# Patient Record
Sex: FEMALE | Race: WHITE | NOT HISPANIC OR LATINO | Employment: FULL TIME | URBAN - NONMETROPOLITAN AREA
[De-identification: names, ages, dates, MRNs, and addresses within clinical notes are randomized per-mention and may not be internally consistent; named-entity substitution may affect disease eponyms.]

---

## 2017-01-20 ENCOUNTER — APPOINTMENT (OUTPATIENT)
Dept: CT IMAGING | Facility: HOSPITAL | Age: 42
End: 2017-01-20

## 2017-01-20 ENCOUNTER — APPOINTMENT (OUTPATIENT)
Dept: GENERAL RADIOLOGY | Facility: HOSPITAL | Age: 42
End: 2017-01-20

## 2017-01-20 ENCOUNTER — HOSPITAL ENCOUNTER (EMERGENCY)
Facility: HOSPITAL | Age: 42
Discharge: HOME | End: 2017-01-21
Attending: FAMILY MEDICINE | Admitting: EMERGENCY MEDICINE

## 2017-01-20 DIAGNOSIS — R11.2 NON-INTRACTABLE VOMITING WITH NAUSEA, UNSPECIFIED VOMITING TYPE: Primary | ICD-10-CM

## 2017-01-20 LAB
ALBUMIN SERPL-MCNC: 4.2 G/DL (ref 3.5–5)
ALBUMIN/GLOB SERPL: 1.4 G/DL (ref 1.5–2.5)
ALP SERPL-CCNC: 64 U/L (ref 46–116)
ALT SERPL W P-5'-P-CCNC: 82 U/L (ref 10–36)
ANION GAP SERPL CALCULATED.3IONS-SCNC: 12.2 MMOL/L (ref 3.6–11.2)
AST SERPL-CCNC: 72 U/L (ref 10–30)
BASOPHILS # BLD AUTO: 0.02 10*3/MM3 (ref 0–0.3)
BASOPHILS NFR BLD AUTO: 0.3 % (ref 0–2)
BILIRUB SERPL-MCNC: 0.6 MG/DL (ref 0.2–1.8)
BUN BLD-MCNC: 10 MG/DL (ref 7–21)
BUN/CREAT SERPL: 13.9 (ref 7–25)
CALCIUM SPEC-SCNC: 9.3 MG/DL (ref 7.7–10)
CHLORIDE SERPL-SCNC: 103 MMOL/L (ref 99–112)
CO2 SERPL-SCNC: 25.8 MMOL/L (ref 24.3–31.9)
CREAT BLD-MCNC: 0.72 MG/DL (ref 0.43–1.29)
DEPRECATED RDW RBC AUTO: 36 FL (ref 37–54)
DEVELOPER EXPIRATION DATE: NORMAL
DEVELOPER LOT NUMBER: NORMAL
EOSINOPHIL # BLD AUTO: 0.15 10*3/MM3 (ref 0–0.7)
EOSINOPHIL NFR BLD AUTO: 2.4 % (ref 0–5)
ERYTHROCYTE [DISTWIDTH] IN BLOOD BY AUTOMATED COUNT: 12.5 % (ref 11.5–14.5)
EXPIRATION DATE: NORMAL
FECAL OCCULT BLOOD SCREEN, POC: NORMAL
GFR SERPL CREATININE-BSD FRML MDRD: 90 ML/MIN/1.73
GLOBULIN UR ELPH-MCNC: 3.1 GM/DL
GLUCOSE BLD-MCNC: 279 MG/DL (ref 70–110)
HCT VFR BLD AUTO: 44.1 % (ref 37–47)
HGB BLD-MCNC: 15.2 G/DL (ref 12–16)
IMM GRANULOCYTES # BLD: 0.04 10*3/MM3 (ref 0–0.03)
IMM GRANULOCYTES NFR BLD: 0.6 % (ref 0–0.5)
LIPASE SERPL-CCNC: 38 U/L (ref 13–60)
LYMPHOCYTES # BLD AUTO: 2.39 10*3/MM3 (ref 1–3)
LYMPHOCYTES NFR BLD AUTO: 38.2 % (ref 21–51)
Lab: NORMAL
MCH RBC QN AUTO: 27.8 PG (ref 27–33)
MCHC RBC AUTO-ENTMCNC: 34.5 G/DL (ref 33–37)
MCV RBC AUTO: 80.6 FL (ref 80–94)
MONOCYTES # BLD AUTO: 0.35 10*3/MM3 (ref 0.1–0.9)
MONOCYTES NFR BLD AUTO: 5.6 % (ref 0–10)
NEGATIVE CONTROL: NEGATIVE
NEUTROPHILS # BLD AUTO: 3.3 10*3/MM3 (ref 1.4–6.5)
NEUTROPHILS NFR BLD AUTO: 52.9 % (ref 30–70)
OSMOLALITY SERPL CALC.SUM OF ELEC: 290.3 MOSM/KG (ref 273–305)
PLATELET # BLD AUTO: 193 10*3/MM3 (ref 130–400)
PMV BLD AUTO: 11 FL (ref 6–10)
POSITIVE CONTROL: POSITIVE
POTASSIUM BLD-SCNC: 3.4 MMOL/L (ref 3.5–5.3)
PROT SERPL-MCNC: 7.3 G/DL (ref 6–8)
RBC # BLD AUTO: 5.47 10*6/MM3 (ref 4.2–5.4)
SODIUM BLD-SCNC: 141 MMOL/L (ref 135–153)
WBC NRBC COR # BLD: 6.25 10*3/MM3 (ref 4.5–12.5)

## 2017-01-20 PROCEDURE — 96375 TX/PRO/DX INJ NEW DRUG ADDON: CPT

## 2017-01-20 PROCEDURE — 25010000002 PROMETHAZINE PER 50 MG: Performed by: NURSE PRACTITIONER

## 2017-01-20 PROCEDURE — 25010000002 ONDANSETRON PER 1 MG: Performed by: NURSE PRACTITIONER

## 2017-01-20 PROCEDURE — 85025 COMPLETE CBC W/AUTO DIFF WBC: CPT | Performed by: NURSE PRACTITIONER

## 2017-01-20 PROCEDURE — 25010000002 MORPHINE (PF) 10 MG/ML SOLUTION: Performed by: NURSE PRACTITIONER

## 2017-01-20 PROCEDURE — 74022 RADEX COMPL AQT ABD SERIES: CPT | Performed by: RADIOLOGY

## 2017-01-20 PROCEDURE — 96361 HYDRATE IV INFUSION ADD-ON: CPT

## 2017-01-20 PROCEDURE — 36415 COLL VENOUS BLD VENIPUNCTURE: CPT

## 2017-01-20 PROCEDURE — 96374 THER/PROPH/DIAG INJ IV PUSH: CPT

## 2017-01-20 PROCEDURE — 80053 COMPREHEN METABOLIC PANEL: CPT | Performed by: NURSE PRACTITIONER

## 2017-01-20 PROCEDURE — 74176 CT ABD & PELVIS W/O CONTRAST: CPT

## 2017-01-20 PROCEDURE — 99284 EMERGENCY DEPT VISIT MOD MDM: CPT

## 2017-01-20 PROCEDURE — 74176 CT ABD & PELVIS W/O CONTRAST: CPT | Performed by: RADIOLOGY

## 2017-01-20 PROCEDURE — 74022 RADEX COMPL AQT ABD SERIES: CPT

## 2017-01-20 PROCEDURE — 83690 ASSAY OF LIPASE: CPT | Performed by: NURSE PRACTITIONER

## 2017-01-20 RX ORDER — PROMETHAZINE HYDROCHLORIDE 25 MG/1
25 SUPPOSITORY RECTAL EVERY 6 HOURS PRN
Qty: 10 SUPPOSITORY | Refills: 0 | Status: SHIPPED | OUTPATIENT
Start: 2017-01-20 | End: 2022-04-06

## 2017-01-20 RX ORDER — PROMETHAZINE HYDROCHLORIDE 25 MG/ML
12.5 INJECTION, SOLUTION INTRAMUSCULAR; INTRAVENOUS ONCE
Status: COMPLETED | OUTPATIENT
Start: 2017-01-20 | End: 2017-01-20

## 2017-01-20 RX ORDER — MORPHINE SULFATE 10 MG/ML
4 INJECTION INTRAMUSCULAR; INTRAVENOUS; SUBCUTANEOUS ONCE
Status: COMPLETED | OUTPATIENT
Start: 2017-01-20 | End: 2017-01-20

## 2017-01-20 RX ORDER — LISINOPRIL 20 MG/1
20 TABLET ORAL DAILY
COMMUNITY

## 2017-01-20 RX ORDER — SODIUM CHLORIDE 0.9 % (FLUSH) 0.9 %
10 SYRINGE (ML) INJECTION AS NEEDED
Status: DISCONTINUED | OUTPATIENT
Start: 2017-01-20 | End: 2017-01-21 | Stop reason: HOSPADM

## 2017-01-20 RX ORDER — ONDANSETRON 4 MG/1
4 TABLET, ORALLY DISINTEGRATING ORAL EVERY 6 HOURS PRN
Qty: 10 TABLET | Refills: 0 | Status: SHIPPED | OUTPATIENT
Start: 2017-01-20

## 2017-01-20 RX ORDER — PANTOPRAZOLE SODIUM 40 MG/10ML
80 INJECTION, POWDER, LYOPHILIZED, FOR SOLUTION INTRAVENOUS ONCE
Status: COMPLETED | OUTPATIENT
Start: 2017-01-20 | End: 2017-01-20

## 2017-01-20 RX ORDER — ESCITALOPRAM OXALATE 5 MG/1
5 TABLET ORAL DAILY
COMMUNITY
End: 2022-04-06

## 2017-01-20 RX ORDER — ONDANSETRON 2 MG/ML
4 INJECTION INTRAMUSCULAR; INTRAVENOUS ONCE
Status: COMPLETED | OUTPATIENT
Start: 2017-01-20 | End: 2017-01-20

## 2017-01-20 RX ORDER — GLIPIZIDE 5 MG/1
5 TABLET ORAL
COMMUNITY
End: 2022-04-06

## 2017-01-20 RX ORDER — PROMETHAZINE HYDROCHLORIDE 25 MG/1
25 SUPPOSITORY RECTAL ONCE
Status: COMPLETED | OUTPATIENT
Start: 2017-01-20 | End: 2017-01-21

## 2017-01-20 RX ADMIN — MORPHINE SULFATE 4 MG: 10 INJECTION, SOLUTION INTRAMUSCULAR; INTRAVENOUS at 21:27

## 2017-01-20 RX ADMIN — SODIUM CHLORIDE 1000 ML: 9 INJECTION, SOLUTION INTRAVENOUS at 21:20

## 2017-01-20 RX ADMIN — PANTOPRAZOLE SODIUM 80 MG: 40 INJECTION, POWDER, FOR SOLUTION INTRAVENOUS at 21:23

## 2017-01-20 RX ADMIN — PROMETHAZINE HYDROCHLORIDE 12.5 MG: 25 INJECTION INTRAMUSCULAR; INTRAVENOUS at 23:07

## 2017-01-20 RX ADMIN — ONDANSETRON 4 MG: 2 INJECTION, SOLUTION INTRAMUSCULAR; INTRAVENOUS at 21:21

## 2017-01-21 VITALS
SYSTOLIC BLOOD PRESSURE: 127 MMHG | TEMPERATURE: 98.2 F | HEIGHT: 65 IN | OXYGEN SATURATION: 98 % | BODY MASS INDEX: 41.65 KG/M2 | HEART RATE: 82 BPM | DIASTOLIC BLOOD PRESSURE: 84 MMHG | WEIGHT: 250 LBS | RESPIRATION RATE: 20 BRPM

## 2017-01-21 RX ADMIN — PROMETHAZINE HYDROCHLORIDE 25 MG: 12.5 SUPPOSITORY RECTAL at 00:22

## 2017-01-21 NOTE — ED PROVIDER NOTES
Subjective   Patient is a 41 y.o. female presenting with vomiting.   History provided by:  Patient   used: No    Vomiting   The primary symptoms include abdominal pain, nausea, vomiting and hematemesis. The illness began today (30 min PTA). The onset was sudden. The problem has not changed since onset.  The illness is also significant for dysphagia. Significant associated medical issues include gastric bypass. Associated medical issues comments: lap band.       Review of Systems   Constitutional: Negative.    HENT: Negative.    Cardiovascular: Negative.    Gastrointestinal: Positive for abdominal pain, dysphagia, hematemesis, nausea and vomiting. Negative for blood in stool.   Endocrine: Negative.    Genitourinary: Negative.    Musculoskeletal: Negative.    Skin: Negative.    Allergic/Immunologic: Negative.    Neurological: Negative.    Hematological: Negative.    Psychiatric/Behavioral: Negative.        Past Medical History   Diagnosis Date   • Depression    • Hypertension        No Known Allergies    Past Surgical History   Procedure Laterality Date   • Abdominal surgery     • Cholecystectomy     •  section     • Hysterectomy         Family History   Problem Relation Age of Onset   • No Known Problems Mother    • No Known Problems Father    • No Known Problems Sister    • No Known Problems Brother    • No Known Problems Son    • No Known Problems Daughter    • No Known Problems Maternal Grandmother    • No Known Problems Maternal Grandfather    • No Known Problems Paternal Grandmother    • No Known Problems Paternal Grandfather    • No Known Problems Cousin    • Rheum arthritis Neg Hx    • Osteoarthritis Neg Hx    • Asthma Neg Hx    • Diabetes Neg Hx    • Heart failure Neg Hx    • Hyperlipidemia Neg Hx    • Hypertension Neg Hx    • Migraines Neg Hx    • Rashes / Skin problems Neg Hx    • Seizures Neg Hx    • Stroke Neg Hx    • Thyroid disease Neg Hx        Social History     Social  History   • Marital status: Unknown     Spouse name: N/A   • Number of children: N/A   • Years of education: N/A     Social History Main Topics   • Smoking status: Never Smoker   • Smokeless tobacco: Never Used   • Alcohol use No   • Drug use: No   • Sexual activity: Defer     Other Topics Concern   • None     Social History Narrative   • None           Objective   Physical Exam   Constitutional: She is oriented to person, place, and time. She appears well-developed and well-nourished.   HENT:   Head: Normocephalic and atraumatic.   Nose: Nose normal.   Eyes: EOM are normal. Pupils are equal, round, and reactive to light.   Neck: Normal range of motion.   Cardiovascular: Regular rhythm and normal heart sounds.    tachycardia   Pulmonary/Chest: Effort normal and breath sounds normal.   Abdominal: Soft. There is tenderness.   Hyperactive bowel sounds, mild tenderness epigastric area. Patient vomiting, tiny amount of pink tinged sputum in basin.    Musculoskeletal: Normal range of motion.   Neurological: She is alert and oriented to person, place, and time.   Skin: Skin is warm and dry.   Psychiatric: She has a normal mood and affect. Her behavior is normal. Judgment and thought content normal.   Nursing note and vitals reviewed.      Procedures         ED Course  ED Course   Value Comment By Time    Patient says she feels much better Selam Chau, APRN 01/20 2158   XR Abdomen 2 View With Chest 1 View Impression: mild constipation. No free air, no gastric dilation.   [facility] report Selam Chau, APRN 01/20 2237    Patient reports dry heaving at this time, new orders placed Selam Chau, APRN 01/20 2254   CT Abdomen Pelvis Without Contrast Mild constipation per [facility] Selam Chau, APRN 01/20 2346                  MDM  Number of Diagnoses or Management Options  Non-intractable vomiting with nausea, unspecified vomiting type: new and requires workup     Amount and/or Complexity of  Data Reviewed  Clinical lab tests: ordered and reviewed  Tests in the radiology section of CPT®: reviewed and ordered  Tests in the medicine section of CPT®: reviewed and ordered    Risk of Complications, Morbidity, and/or Mortality  Presenting problems: moderate  Diagnostic procedures: moderate  Management options: moderate    Patient Progress  Patient progress: improved      Final diagnoses:   Non-intractable vomiting with nausea, unspecified vomiting type            Selam Chau, APRN  01/20/17 4897

## 2017-09-28 ENCOUNTER — HOSPITAL ENCOUNTER (EMERGENCY)
Facility: HOSPITAL | Age: 42
Discharge: LEFT BEFORE DISCHARGE | End: 2017-09-28
Attending: FAMILY MEDICINE | Admitting: FAMILY MEDICINE

## 2017-09-28 VITALS
WEIGHT: 250 LBS | OXYGEN SATURATION: 95 % | RESPIRATION RATE: 20 BRPM | TEMPERATURE: 98.1 F | SYSTOLIC BLOOD PRESSURE: 137 MMHG | DIASTOLIC BLOOD PRESSURE: 76 MMHG | BODY MASS INDEX: 41.65 KG/M2 | HEIGHT: 65 IN | HEART RATE: 70 BPM

## 2017-09-28 DIAGNOSIS — E11.9 TYPE 2 DIABETES MELLITUS TREATED WITH INSULIN (HCC): ICD-10-CM

## 2017-09-28 DIAGNOSIS — I10 ESSENTIAL HYPERTENSION: Primary | ICD-10-CM

## 2017-09-28 DIAGNOSIS — Z79.4 TYPE 2 DIABETES MELLITUS TREATED WITH INSULIN (HCC): ICD-10-CM

## 2017-09-28 PROCEDURE — 99282 EMERGENCY DEPT VISIT SF MDM: CPT

## 2017-09-28 PROCEDURE — 93005 ELECTROCARDIOGRAM TRACING: CPT | Performed by: EMERGENCY MEDICINE

## 2017-09-28 PROCEDURE — 93010 ELECTROCARDIOGRAM REPORT: CPT | Performed by: INTERNAL MEDICINE

## 2017-09-28 RX ORDER — SODIUM CHLORIDE 0.9 % (FLUSH) 0.9 %
10 SYRINGE (ML) INJECTION AS NEEDED
Status: DISCONTINUED | OUTPATIENT
Start: 2017-09-28 | End: 2017-09-28 | Stop reason: HOSPADM

## 2018-07-30 ENCOUNTER — HOSPITAL ENCOUNTER (OUTPATIENT)
Dept: MAMMOGRAPHY | Facility: HOSPITAL | Age: 43
Discharge: HOME | End: 2018-07-30
Admitting: OBSTETRICS & GYNECOLOGY

## 2018-07-30 DIAGNOSIS — Z12.39 SCREENING BREAST EXAMINATION: ICD-10-CM

## 2018-07-30 PROCEDURE — 77067 SCR MAMMO BI INCL CAD: CPT | Performed by: RADIOLOGY

## 2018-07-30 PROCEDURE — 77063 BREAST TOMOSYNTHESIS BI: CPT | Performed by: RADIOLOGY

## 2018-07-30 PROCEDURE — 77063 BREAST TOMOSYNTHESIS BI: CPT

## 2018-07-30 PROCEDURE — 77067 SCR MAMMO BI INCL CAD: CPT

## 2018-09-24 ENCOUNTER — HOSPITAL ENCOUNTER (EMERGENCY)
Facility: HOSPITAL | Age: 43
Discharge: HOME | End: 2018-09-24
Attending: EMERGENCY MEDICINE | Admitting: EMERGENCY MEDICINE

## 2018-09-24 DIAGNOSIS — Z79.4 TYPE 2 DIABETES MELLITUS WITH HYPERGLYCEMIA, WITH LONG-TERM CURRENT USE OF INSULIN (HCC): Primary | ICD-10-CM

## 2018-09-24 DIAGNOSIS — E11.65 TYPE 2 DIABETES MELLITUS WITH HYPERGLYCEMIA, WITH LONG-TERM CURRENT USE OF INSULIN (HCC): Primary | ICD-10-CM

## 2018-09-24 LAB
ALBUMIN SERPL-MCNC: 3.9 G/DL (ref 3.5–5)
ALBUMIN/GLOB SERPL: 1.3 G/DL (ref 1.5–2.5)
ALP SERPL-CCNC: 65 U/L (ref 35–104)
ALT SERPL W P-5'-P-CCNC: 83 U/L (ref 10–36)
ANION GAP SERPL CALCULATED.3IONS-SCNC: 9.9 MMOL/L (ref 3.6–11.2)
AST SERPL-CCNC: 64 U/L (ref 10–30)
B-HCG UR QL: NEGATIVE
BASOPHILS # BLD AUTO: 0.04 10*3/MM3 (ref 0–0.3)
BASOPHILS NFR BLD AUTO: 0.6 % (ref 0–2)
BILIRUB SERPL-MCNC: 0.4 MG/DL (ref 0.2–1.8)
BILIRUB UR QL STRIP: NEGATIVE
BUN BLD-MCNC: 10 MG/DL (ref 7–21)
BUN/CREAT SERPL: 12.7 (ref 7–25)
CALCIUM SPEC-SCNC: 8.8 MG/DL (ref 7.7–10)
CHLORIDE SERPL-SCNC: 101 MMOL/L (ref 99–112)
CLARITY UR: CLEAR
CO2 SERPL-SCNC: 24.1 MMOL/L (ref 24.3–31.9)
COLOR UR: YELLOW
CREAT BLD-MCNC: 0.79 MG/DL (ref 0.43–1.29)
DEPRECATED RDW RBC AUTO: 37 FL (ref 37–54)
EOSINOPHIL # BLD AUTO: 0.23 10*3/MM3 (ref 0–0.7)
EOSINOPHIL NFR BLD AUTO: 3.4 % (ref 0–5)
ERYTHROCYTE [DISTWIDTH] IN BLOOD BY AUTOMATED COUNT: 12.8 % (ref 11.5–14.5)
GFR SERPL CREATININE-BSD FRML MDRD: 80 ML/MIN/1.73
GLOBULIN UR ELPH-MCNC: 3.1 GM/DL
GLUCOSE BLD-MCNC: 386 MG/DL (ref 70–110)
GLUCOSE BLDC GLUCOMTR-MCNC: 355 MG/DL (ref 70–130)
GLUCOSE BLDC GLUCOMTR-MCNC: 379 MG/DL (ref 70–130)
GLUCOSE UR STRIP-MCNC: ABNORMAL MG/DL
HBA1C MFR BLD: 10.2 % (ref 4.5–5.7)
HCT VFR BLD AUTO: 41.4 % (ref 37–47)
HGB BLD-MCNC: 14.4 G/DL (ref 12–16)
HGB UR QL STRIP.AUTO: NEGATIVE
HOLD SPECIMEN: NORMAL
HOLD SPECIMEN: NORMAL
IMM GRANULOCYTES # BLD: 0.02 10*3/MM3 (ref 0–0.03)
IMM GRANULOCYTES NFR BLD: 0.3 % (ref 0–0.5)
KETONES UR QL STRIP: NEGATIVE
LEUKOCYTE ESTERASE UR QL STRIP.AUTO: NEGATIVE
LYMPHOCYTES # BLD AUTO: 2.76 10*3/MM3 (ref 1–3)
LYMPHOCYTES NFR BLD AUTO: 40.2 % (ref 21–51)
MCH RBC QN AUTO: 28.5 PG (ref 27–33)
MCHC RBC AUTO-ENTMCNC: 34.8 G/DL (ref 33–37)
MCV RBC AUTO: 81.8 FL (ref 80–94)
MONOCYTES # BLD AUTO: 0.38 10*3/MM3 (ref 0.1–0.9)
MONOCYTES NFR BLD AUTO: 5.5 % (ref 0–10)
NEUTROPHILS # BLD AUTO: 3.43 10*3/MM3 (ref 1.4–6.5)
NEUTROPHILS NFR BLD AUTO: 50 % (ref 30–70)
NITRITE UR QL STRIP: NEGATIVE
OSMOLALITY SERPL CALC.SUM OF ELEC: 285.1 MOSM/KG (ref 273–305)
PH UR STRIP.AUTO: 5.5 [PH] (ref 5–8)
PLATELET # BLD AUTO: 162 10*3/MM3 (ref 130–400)
PMV BLD AUTO: 11.3 FL (ref 6–10)
POTASSIUM BLD-SCNC: 3.7 MMOL/L (ref 3.5–5.3)
PROT SERPL-MCNC: 7 G/DL (ref 6–8)
PROT UR QL STRIP: NEGATIVE
RBC # BLD AUTO: 5.06 10*6/MM3 (ref 4.2–5.4)
SODIUM BLD-SCNC: 135 MMOL/L (ref 135–153)
SP GR UR STRIP: >1.03 (ref 1–1.03)
UROBILINOGEN UR QL STRIP: ABNORMAL
WBC NRBC COR # BLD: 6.86 10*3/MM3 (ref 4.5–12.5)
WHOLE BLOOD HOLD SPECIMEN: NORMAL
WHOLE BLOOD HOLD SPECIMEN: NORMAL

## 2018-09-24 PROCEDURE — 85025 COMPLETE CBC W/AUTO DIFF WBC: CPT | Performed by: PHYSICIAN ASSISTANT

## 2018-09-24 PROCEDURE — 96360 HYDRATION IV INFUSION INIT: CPT

## 2018-09-24 PROCEDURE — 83036 HEMOGLOBIN GLYCOSYLATED A1C: CPT | Performed by: PHYSICIAN ASSISTANT

## 2018-09-24 PROCEDURE — 63710000001 INSULIN REGULAR HUMAN PER 5 UNITS: Performed by: PHYSICIAN ASSISTANT

## 2018-09-24 PROCEDURE — 99284 EMERGENCY DEPT VISIT MOD MDM: CPT

## 2018-09-24 PROCEDURE — 81025 URINE PREGNANCY TEST: CPT | Performed by: PHYSICIAN ASSISTANT

## 2018-09-24 PROCEDURE — 81003 URINALYSIS AUTO W/O SCOPE: CPT | Performed by: PHYSICIAN ASSISTANT

## 2018-09-24 PROCEDURE — 82962 GLUCOSE BLOOD TEST: CPT

## 2018-09-24 PROCEDURE — 80053 COMPREHEN METABOLIC PANEL: CPT | Performed by: PHYSICIAN ASSISTANT

## 2018-09-24 RX ORDER — INSULIN GLARGINE 100 [IU]/ML
80 INJECTION, SOLUTION SUBCUTANEOUS DAILY
COMMUNITY
End: 2022-04-06

## 2018-09-24 RX ORDER — SODIUM CHLORIDE 0.9 % (FLUSH) 0.9 %
10 SYRINGE (ML) INJECTION AS NEEDED
Status: DISCONTINUED | OUTPATIENT
Start: 2018-09-24 | End: 2018-09-25 | Stop reason: HOSPADM

## 2018-09-24 RX ADMIN — HUMAN INSULIN 5 UNITS: 100 INJECTION, SOLUTION SUBCUTANEOUS at 18:22

## 2018-09-24 RX ADMIN — SODIUM CHLORIDE 2000 ML: 9 INJECTION, SOLUTION INTRAVENOUS at 18:22

## 2018-09-25 VITALS
HEART RATE: 70 BPM | TEMPERATURE: 98 F | WEIGHT: 250 LBS | RESPIRATION RATE: 18 BRPM | HEIGHT: 65 IN | BODY MASS INDEX: 41.65 KG/M2 | SYSTOLIC BLOOD PRESSURE: 120 MMHG | DIASTOLIC BLOOD PRESSURE: 70 MMHG | OXYGEN SATURATION: 98 %

## 2018-09-25 NOTE — ED NOTES
Pt resting on stretcher at this time, denies any needs or pain. Respirations are even and unlabored, NADN, skin PWD, bed in low position, one rail up, call light is within reach. Will continue to monitor pt status and follow the plan of care.     Giuliano Bauman RN  09/25/18 0003

## 2018-09-25 NOTE — ED NOTES
Pt resting on stretcher at this time, denies any needs or pain. Respirations are even and unlabored, NADN, skin PWD, bed in low position, one rail up, call light is within reach. Will continue to monitor pt status and follow the plan of care.     Giuliano Bauman RN  09/24/18 4603

## 2018-09-25 NOTE — ED NOTES
Pt resting on stretcher at this time, denies any needs or pain. Respirations are even and unlabored, NADN, skin PWD, bed in low position, one rail up, call light is within reach. Will continue to monitor pt status and follow the plan of care.     Giuliano Bauman RN  09/24/18 0989

## 2018-09-25 NOTE — ED NOTES
Pt resting on stretcher at this time, denies any needs or pain. Respirations are even and unlabored, NADN, skin PWD, bed in low position, one rail up, call light is within reach. Will continue to monitor pt status and follow the plan of care.     Giuliano Bauman RN  09/24/18 4151

## 2018-09-25 NOTE — ED NOTES
Pt resting on stretcher at this time, denies any needs or pain. Respirations are even and unlabored, NADN, skin PWD, bed in low position, one rail up, call light is within reach. Will continue to monitor pt status and follow the plan of care.     Giuliano Bauman RN  09/24/18 7272

## 2018-09-27 NOTE — ED PROVIDER NOTES
Subjective   43 year old female presents to the ED today for elevated blood sugar.  She states she days ago her blood sugar was running over 800.  She states today it was 400.  She states normally she tries to keep her under 200.  She states she feels very fatigued and has had decreased energy.  She has had increased thirst and increased urination.  She denies any known fever.  She typically takes Lantus 80 units a day and NovoLog 4 units 3 times a day.  She states today she increased her Lantus 200 units and she has taken her NovoLog twice so far today.  She states her family doctor sent her up here for IV fluids and IV insulin.  She denies any recent illness.  She denies any recent change in her medications.  She states she has been using her medications as prescribed.  She denies any change in her diet.        History provided by:  Patient  Hyperglycemia   Blood sugar level PTA:  400  Severity:  Severe  Onset quality:  Gradual  Duration:  2 days  Timing:  Constant  Progression:  Worsening  Chronicity:  New  Diabetes status:  Controlled with insulin  Context: not change in medication, not noncompliance and not recent illness    Relieved by:  Nothing  Ineffective treatments:  Insulin  Associated symptoms: fatigue, increased thirst, malaise and polyuria    Associated symptoms: no abdominal pain, no altered mental status, no blurred vision, no chest pain, no confusion, no dehydration, no diaphoresis, no dizziness, no dysuria, no fever, no increased appetite, no nausea, no shortness of breath, no syncope, no vomiting, no weakness and no weight change    Risk factors: obesity        Review of Systems   Constitutional: Positive for fatigue. Negative for appetite change, diaphoresis and fever.   HENT: Negative.    Eyes: Negative.  Negative for blurred vision.   Respiratory: Negative for shortness of breath.    Cardiovascular: Negative for chest pain and syncope.   Gastrointestinal: Negative for abdominal pain, nausea and  vomiting.   Endocrine: Positive for polydipsia and polyuria.   Genitourinary: Positive for frequency. Negative for dysuria.   Musculoskeletal: Negative.    Skin: Negative.    Neurological: Negative for dizziness and weakness.   Psychiatric/Behavioral: Negative for confusion.   All other systems reviewed and are negative.      Past Medical History:   Diagnosis Date   • Depression    • Diabetes mellitus (CMS/HCC)    • Hypertension    • Uterine cancer (CMS/HCC)            No Known Allergies    Past Surgical History:   Procedure Laterality Date   • ABDOMINAL SURGERY     • BREAST LUMPECTOMY Left     benign   •  SECTION     • CHOLECYSTECTOMY     • HYSTERECTOMY         Family History   Problem Relation Age of Onset   • No Known Problems Mother    • No Known Problems Father    • No Known Problems Sister    • No Known Problems Brother    • No Known Problems Son    • No Known Problems Daughter    • No Known Problems Maternal Grandmother    • No Known Problems Maternal Grandfather    • No Known Problems Paternal Grandmother    • No Known Problems Paternal Grandfather    • No Known Problems Cousin    • Breast cancer Maternal Aunt    • Rheum arthritis Neg Hx    • Osteoarthritis Neg Hx    • Asthma Neg Hx    • Diabetes Neg Hx    • Heart failure Neg Hx    • Hyperlipidemia Neg Hx    • Hypertension Neg Hx    • Migraines Neg Hx    • Rashes / Skin problems Neg Hx    • Seizures Neg Hx    • Stroke Neg Hx    • Thyroid disease Neg Hx        Social History     Social History   • Marital status:      Social History Main Topics   • Smoking status: Never Smoker   • Smokeless tobacco: Never Used   • Alcohol use No   • Drug use: No   • Sexual activity: Defer     Other Topics Concern   • Not on file           Objective   Physical Exam   Constitutional: She is oriented to person, place, and time. She appears well-developed and well-nourished. No distress.   HENT:   Head: Normocephalic and atraumatic.   Right Ear:  External ear normal.   Left Ear: External ear normal.   Nose: Nose normal.   Mouth/Throat: Oropharynx is clear and moist.   Eyes: Pupils are equal, round, and reactive to light. Conjunctivae and EOM are normal.   Neck: Normal range of motion. Neck supple.   Cardiovascular: Normal rate, regular rhythm, normal heart sounds and intact distal pulses.    Pulmonary/Chest: Effort normal and breath sounds normal.   Abdominal: Soft. Bowel sounds are normal.   Musculoskeletal: Normal range of motion.   Neurological: She is alert and oriented to person, place, and time.   Skin: Skin is warm and dry. Capillary refill takes less than 2 seconds.   Psychiatric: She has a normal mood and affect. Her behavior is normal. Judgment and thought content normal.   Nursing note and vitals reviewed.      Procedures           ED Course  ED Course as of Sep 27 0752   Mon Sep 24, 2018   1943 Patient reports she is feeling better, blood sugar is improving.  Will discharge home after IV fluids to follow up outpatient.  [AH]      ED Course User Index  [AH] Makenzie Wen PA                  MDM  Number of Diagnoses or Management Options  Type 2 diabetes mellitus with hyperglycemia, with long-term current use of insulin (CMS/[facility]):      Amount and/or Complexity of Data Reviewed  Clinical lab tests: reviewed    Patient Progress  Patient progress: improved        Final diagnoses:   Type 2 diabetes mellitus with hyperglycemia, with long-term current use of insulin (CMS/[facility])            Makenzie Wen PA  09/27/18 4154

## 2018-11-12 ENCOUNTER — HOSPITAL ENCOUNTER (EMERGENCY)
Facility: HOSPITAL | Age: 43
Discharge: HOME | End: 2018-11-12
Attending: EMERGENCY MEDICINE

## 2018-11-12 ENCOUNTER — APPOINTMENT (OUTPATIENT)
Dept: GENERAL RADIOLOGY | Facility: HOSPITAL | Age: 43
End: 2018-11-12

## 2018-11-12 VITALS
SYSTOLIC BLOOD PRESSURE: 137 MMHG | HEART RATE: 72 BPM | TEMPERATURE: 98.1 F | BODY MASS INDEX: 40.32 KG/M2 | WEIGHT: 242 LBS | DIASTOLIC BLOOD PRESSURE: 88 MMHG | HEIGHT: 65 IN | OXYGEN SATURATION: 99 % | RESPIRATION RATE: 18 BRPM

## 2018-11-12 DIAGNOSIS — R07.9 CHEST PAIN, UNSPECIFIED TYPE: Primary | ICD-10-CM

## 2018-11-12 LAB
ALBUMIN SERPL-MCNC: 4.1 G/DL (ref 3.5–5)
ALBUMIN/GLOB SERPL: 1.5 G/DL (ref 1.5–2.5)
ALP SERPL-CCNC: 69 U/L (ref 35–104)
ALT SERPL W P-5'-P-CCNC: 77 U/L (ref 10–36)
ANION GAP SERPL CALCULATED.3IONS-SCNC: 7.8 MMOL/L (ref 3.6–11.2)
AST SERPL-CCNC: 56 U/L (ref 10–30)
BASOPHILS # BLD AUTO: 0.03 10*3/MM3 (ref 0–0.3)
BASOPHILS NFR BLD AUTO: 0.5 % (ref 0–2)
BILIRUB SERPL-MCNC: 0.5 MG/DL (ref 0.2–1.8)
BILIRUB UR QL STRIP: NEGATIVE
BUN BLD-MCNC: 14 MG/DL (ref 7–21)
BUN/CREAT SERPL: 14 (ref 7–25)
CALCIUM SPEC-SCNC: 9.2 MG/DL (ref 7.7–10)
CHLORIDE SERPL-SCNC: 102 MMOL/L (ref 99–112)
CK MB SERPL-CCNC: 0.96 NG/ML (ref 0–5)
CK MB SERPL-RTO: 1.5 % (ref 0–3)
CK SERPL-CCNC: 65 U/L (ref 24–173)
CLARITY UR: ABNORMAL
CO2 SERPL-SCNC: 26.2 MMOL/L (ref 24.3–31.9)
COLOR UR: YELLOW
CREAT BLD-MCNC: 1 MG/DL (ref 0.43–1.29)
D DIMER PPP FEU-MCNC: <0.27 MCGFEU/ML (ref 0–0.5)
DEPRECATED RDW RBC AUTO: 38.3 FL (ref 37–54)
EOSINOPHIL # BLD AUTO: 0.14 10*3/MM3 (ref 0–0.7)
EOSINOPHIL NFR BLD AUTO: 2.6 % (ref 0–5)
ERYTHROCYTE [DISTWIDTH] IN BLOOD BY AUTOMATED COUNT: 13 % (ref 11.5–14.5)
GFR SERPL CREATININE-BSD FRML MDRD: 61 ML/MIN/1.73
GLOBULIN UR ELPH-MCNC: 2.8 GM/DL
GLUCOSE BLD-MCNC: 464 MG/DL (ref 70–110)
GLUCOSE BLDC GLUCOMTR-MCNC: 312 MG/DL (ref 70–130)
GLUCOSE BLDC GLUCOMTR-MCNC: 342 MG/DL (ref 70–130)
GLUCOSE UR STRIP-MCNC: ABNORMAL MG/DL
HCT VFR BLD AUTO: 41.3 % (ref 37–47)
HGB BLD-MCNC: 14.2 G/DL (ref 12–16)
HGB UR QL STRIP.AUTO: NEGATIVE
HOLD SPECIMEN: NORMAL
HOLD SPECIMEN: NORMAL
IMM GRANULOCYTES # BLD: 0.03 10*3/MM3 (ref 0–0.03)
IMM GRANULOCYTES NFR BLD: 0.5 % (ref 0–0.5)
KETONES UR QL STRIP: NEGATIVE
LEUKOCYTE ESTERASE UR QL STRIP.AUTO: NEGATIVE
LYMPHOCYTES # BLD AUTO: 2 10*3/MM3 (ref 1–3)
LYMPHOCYTES NFR BLD AUTO: 36.6 % (ref 21–51)
MCH RBC QN AUTO: 28.1 PG (ref 27–33)
MCHC RBC AUTO-ENTMCNC: 34.4 G/DL (ref 33–37)
MCV RBC AUTO: 81.6 FL (ref 80–94)
MONOCYTES # BLD AUTO: 0.27 10*3/MM3 (ref 0.1–0.9)
MONOCYTES NFR BLD AUTO: 4.9 % (ref 0–10)
NEUTROPHILS # BLD AUTO: 3 10*3/MM3 (ref 1.4–6.5)
NEUTROPHILS NFR BLD AUTO: 54.9 % (ref 30–70)
NITRITE UR QL STRIP: NEGATIVE
OSMOLALITY SERPL CALC.SUM OF ELEC: 292.7 MOSM/KG (ref 273–305)
PH UR STRIP.AUTO: 5.5 [PH] (ref 5–8)
PLATELET # BLD AUTO: 147 10*3/MM3 (ref 130–400)
PMV BLD AUTO: 11 FL (ref 6–10)
POTASSIUM BLD-SCNC: 4.1 MMOL/L (ref 3.5–5.3)
PROT SERPL-MCNC: 6.9 G/DL (ref 6–8)
PROT UR QL STRIP: NEGATIVE
RBC # BLD AUTO: 5.06 10*6/MM3 (ref 4.2–5.4)
SODIUM BLD-SCNC: 136 MMOL/L (ref 135–153)
SP GR UR STRIP: >1.03 (ref 1–1.03)
TROPONIN I SERPL-MCNC: 0.01 NG/ML
TROPONIN I SERPL-MCNC: <0.006 NG/ML
UROBILINOGEN UR QL STRIP: ABNORMAL
WBC NRBC COR # BLD: 5.47 10*3/MM3 (ref 4.5–12.5)
WHOLE BLOOD HOLD SPECIMEN: NORMAL
WHOLE BLOOD HOLD SPECIMEN: NORMAL

## 2018-11-12 PROCEDURE — 96360 HYDRATION IV INFUSION INIT: CPT

## 2018-11-12 PROCEDURE — 82962 GLUCOSE BLOOD TEST: CPT

## 2018-11-12 PROCEDURE — 84484 ASSAY OF TROPONIN QUANT: CPT | Performed by: PHYSICIAN ASSISTANT

## 2018-11-12 PROCEDURE — 85025 COMPLETE CBC W/AUTO DIFF WBC: CPT | Performed by: EMERGENCY MEDICINE

## 2018-11-12 PROCEDURE — 63710000001 INSULIN REGULAR HUMAN PER 5 UNITS: Performed by: PHYSICIAN ASSISTANT

## 2018-11-12 PROCEDURE — 85379 FIBRIN DEGRADATION QUANT: CPT | Performed by: PHYSICIAN ASSISTANT

## 2018-11-12 PROCEDURE — 80053 COMPREHEN METABOLIC PANEL: CPT | Performed by: EMERGENCY MEDICINE

## 2018-11-12 PROCEDURE — 84484 ASSAY OF TROPONIN QUANT: CPT | Performed by: EMERGENCY MEDICINE

## 2018-11-12 PROCEDURE — 99285 EMERGENCY DEPT VISIT HI MDM: CPT

## 2018-11-12 PROCEDURE — 36415 COLL VENOUS BLD VENIPUNCTURE: CPT

## 2018-11-12 PROCEDURE — 71045 X-RAY EXAM CHEST 1 VIEW: CPT

## 2018-11-12 PROCEDURE — 82553 CREATINE MB FRACTION: CPT | Performed by: PHYSICIAN ASSISTANT

## 2018-11-12 PROCEDURE — 93010 ELECTROCARDIOGRAM REPORT: CPT | Performed by: INTERNAL MEDICINE

## 2018-11-12 PROCEDURE — 93005 ELECTROCARDIOGRAM TRACING: CPT | Performed by: EMERGENCY MEDICINE

## 2018-11-12 PROCEDURE — 81003 URINALYSIS AUTO W/O SCOPE: CPT | Performed by: EMERGENCY MEDICINE

## 2018-11-12 PROCEDURE — 71045 X-RAY EXAM CHEST 1 VIEW: CPT | Performed by: RADIOLOGY

## 2018-11-12 PROCEDURE — 82550 ASSAY OF CK (CPK): CPT | Performed by: PHYSICIAN ASSISTANT

## 2018-11-12 RX ORDER — ASPIRIN 325 MG
325 TABLET ORAL ONCE
Status: COMPLETED | OUTPATIENT
Start: 2018-11-12 | End: 2018-11-12

## 2018-11-12 RX ORDER — SODIUM CHLORIDE 0.9 % (FLUSH) 0.9 %
10 SYRINGE (ML) INJECTION AS NEEDED
Status: DISCONTINUED | OUTPATIENT
Start: 2018-11-12 | End: 2018-11-12 | Stop reason: HOSPADM

## 2018-11-12 RX ADMIN — HUMAN INSULIN 10 UNITS: 100 INJECTION, SOLUTION SUBCUTANEOUS at 19:51

## 2018-11-12 RX ADMIN — SODIUM CHLORIDE 1000 ML: 9 INJECTION, SOLUTION INTRAVENOUS at 17:16

## 2018-11-12 RX ADMIN — ASPIRIN 325 MG: 325 TABLET ORAL at 16:25

## 2018-11-13 NOTE — ED PROVIDER NOTES
Subjective   Refer to Aleah Knox note             Review of Systems    Past Medical History:   Diagnosis Date   • Depression    • Diabetes mellitus (CMS/HCC)    • Hypertension    • Uterine cancer (CMS/HCC)            No Known Allergies    Past Surgical History:   Procedure Laterality Date   • ABDOMINAL SURGERY     • BREAST LUMPECTOMY Left     benign   •  SECTION     • CHOLECYSTECTOMY     • HYSTERECTOMY         Family History   Problem Relation Age of Onset   • No Known Problems Mother    • No Known Problems Father    • No Known Problems Sister    • No Known Problems Brother    • No Known Problems Son    • No Known Problems Daughter    • No Known Problems Maternal Grandmother    • No Known Problems Maternal Grandfather    • No Known Problems Paternal Grandmother    • No Known Problems Paternal Grandfather    • No Known Problems Cousin    • Breast cancer Maternal Aunt    • Rheum arthritis Neg Hx    • Osteoarthritis Neg Hx    • Asthma Neg Hx    • Diabetes Neg Hx    • Heart failure Neg Hx    • Hyperlipidemia Neg Hx    • Hypertension Neg Hx    • Migraines Neg Hx    • Rashes / Skin problems Neg Hx    • Seizures Neg Hx    • Stroke Neg Hx    • Thyroid disease Neg Hx        Social History     Socioeconomic History   • Marital status:      Spouse name: Not on file   • Number of children: Not on file   • Years of education: Not on file   • Highest education level: Not on file   Tobacco Use   • Smoking status: Never Smoker   • Smokeless tobacco: Never Used   Substance and Sexual Activity   • Alcohol use: No   • Drug use: No   • Sexual activity: Defer           Objective   Physical Exam    Procedures           ED Course  ED Course as of  Assumed care from Aleah Knox. Patient noted to be chest pain free in no distress. Stress test ordered. Patient to f/u with Dr. Jessica.  Discussed sx that would warrant return to the ED.   [ML]      ED Course User Index  [ML]  Keysha Manning PA                HEART Score (for prediction of 6-week risk of major adverse cardiac event) reviewed and/or performed as part of the patient evaluation and treatment planning process.  The result associated with this review/performance is: 2       MDM      Final diagnoses:   Chest pain, unspecified type            Keysha Manning PA  11/12/18 2029       Keysha Manning PA  11/12/18 2030

## 2018-11-26 ENCOUNTER — TRANSCRIBE ORDERS (OUTPATIENT)
Dept: ADMINISTRATIVE | Facility: HOSPITAL | Age: 43
End: 2018-11-26

## 2018-11-26 DIAGNOSIS — R07.9 CHEST PAIN, UNSPECIFIED TYPE: Primary | ICD-10-CM

## 2018-12-03 ENCOUNTER — HOSPITAL ENCOUNTER (OUTPATIENT)
Dept: NUCLEAR MEDICINE | Facility: HOSPITAL | Age: 43
Discharge: HOME | End: 2018-12-03
Attending: FAMILY MEDICINE

## 2018-12-03 ENCOUNTER — HOSPITAL ENCOUNTER (OUTPATIENT)
Dept: CARDIOLOGY | Facility: HOSPITAL | Age: 43
Discharge: HOME | End: 2018-12-03
Attending: FAMILY MEDICINE

## 2018-12-03 DIAGNOSIS — R07.9 CHEST PAIN, UNSPECIFIED TYPE: ICD-10-CM

## 2018-12-03 PROCEDURE — 93306 TTE W/DOPPLER COMPLETE: CPT

## 2018-12-03 PROCEDURE — 25010000002 REGADENOSON 0.4 MG/5ML SOLUTION: Performed by: FAMILY MEDICINE

## 2018-12-03 PROCEDURE — 93306 TTE W/DOPPLER COMPLETE: CPT | Performed by: INTERNAL MEDICINE

## 2018-12-03 PROCEDURE — A9500 TC99M SESTAMIBI: HCPCS | Performed by: FAMILY MEDICINE

## 2018-12-03 PROCEDURE — 93017 CV STRESS TEST TRACING ONLY: CPT

## 2018-12-03 PROCEDURE — 0 TECHNETIUM SESTAMIBI: Performed by: FAMILY MEDICINE

## 2018-12-03 PROCEDURE — 78452 HT MUSCLE IMAGE SPECT MULT: CPT | Performed by: INTERNAL MEDICINE

## 2018-12-03 PROCEDURE — 78452 HT MUSCLE IMAGE SPECT MULT: CPT

## 2018-12-03 PROCEDURE — 93018 CV STRESS TEST I&R ONLY: CPT | Performed by: INTERNAL MEDICINE

## 2018-12-03 RX ADMIN — TECHNETIUM TC 99M SESTAMIBI 1 DOSE: 1 INJECTION INTRAVENOUS at 08:55

## 2018-12-03 RX ADMIN — REGADENOSON 0.4 MG: 0.08 INJECTION, SOLUTION INTRAVENOUS at 10:20

## 2018-12-03 RX ADMIN — TECHNETIUM TC 99M SESTAMIBI 1 DOSE: 1 INJECTION INTRAVENOUS at 11:13

## 2018-12-04 LAB
BH CV ECHO MEAS - % IVS THICK: -9.9 %
BH CV ECHO MEAS - % LVPW THICK: 109.3 %
BH CV ECHO MEAS - ACS: 2.1 CM
BH CV ECHO MEAS - AO MAX PG: 8.9 MMHG
BH CV ECHO MEAS - AO MEAN PG: 5 MMHG
BH CV ECHO MEAS - AO ROOT AREA (BSA CORRECTED): 1.4
BH CV ECHO MEAS - AO ROOT AREA: 7.2 CM^2
BH CV ECHO MEAS - AO ROOT DIAM: 3 CM
BH CV ECHO MEAS - AO V2 MAX: 149.3 CM/SEC
BH CV ECHO MEAS - AO V2 MEAN: 105.3 CM/SEC
BH CV ECHO MEAS - AO V2 VTI: 35.2 CM
BH CV ECHO MEAS - BSA(HAYCOCK): 2.3 M^2
BH CV ECHO MEAS - BSA: 2.1 M^2
BH CV ECHO MEAS - BZI_BMI: 40.3 KILOGRAMS/M^2
BH CV ECHO MEAS - BZI_METRIC_HEIGHT: 165.1 CM
BH CV ECHO MEAS - BZI_METRIC_WEIGHT: 109.8 KG
BH CV ECHO MEAS - EDV(CUBED): 94.9 ML
BH CV ECHO MEAS - EDV(MOD-SP4): 57 ML
BH CV ECHO MEAS - EDV(TEICH): 95.4 ML
BH CV ECHO MEAS - EF(CUBED): 78.5 %
BH CV ECHO MEAS - EF(MOD-SP4): 73.7 %
BH CV ECHO MEAS - EF(TEICH): 70.8 %
BH CV ECHO MEAS - ESV(CUBED): 20.4 ML
BH CV ECHO MEAS - ESV(MOD-SP4): 15 ML
BH CV ECHO MEAS - ESV(TEICH): 27.9 ML
BH CV ECHO MEAS - FS: 40.1 %
BH CV ECHO MEAS - IVS/LVPW: 1.2
BH CV ECHO MEAS - IVSD: 1 CM
BH CV ECHO MEAS - IVSS: 0.93 CM
BH CV ECHO MEAS - LA DIMENSION: 3 CM
BH CV ECHO MEAS - LA/AO: 0.99
BH CV ECHO MEAS - LV DIASTOLIC VOL/BSA (35-75): 26.6 ML/M^2
BH CV ECHO MEAS - LV MASS(C)D: 145.6 GRAMS
BH CV ECHO MEAS - LV MASS(C)DI: 67.9 GRAMS/M^2
BH CV ECHO MEAS - LV MASS(C)S: 120.3 GRAMS
BH CV ECHO MEAS - LV MASS(C)SI: 56.1 GRAMS/M^2
BH CV ECHO MEAS - LV SYSTOLIC VOL/BSA (12-30): 7 ML/M^2
BH CV ECHO MEAS - LVIDD: 4.6 CM
BH CV ECHO MEAS - LVIDS: 2.7 CM
BH CV ECHO MEAS - LVLD AP4: 7.1 CM
BH CV ECHO MEAS - LVLS AP4: 5.5 CM
BH CV ECHO MEAS - LVOT AREA (M): 3.1 CM^2
BH CV ECHO MEAS - LVOT AREA: 3 CM^2
BH CV ECHO MEAS - LVOT DIAM: 2 CM
BH CV ECHO MEAS - LVPWD: 0.87 CM
BH CV ECHO MEAS - LVPWS: 1.8 CM
BH CV ECHO MEAS - MV A MAX VEL: 84.9 CM/SEC
BH CV ECHO MEAS - MV E MAX VEL: 116 CM/SEC
BH CV ECHO MEAS - MV E/A: 1.4
BH CV ECHO MEAS - PA ACC SLOPE: 1793 CM/SEC^2
BH CV ECHO MEAS - PA ACC TIME: 0.07 SEC
BH CV ECHO MEAS - PA PR(ACCEL): 47.3 MMHG
BH CV ECHO MEAS - RAP SYSTOLE: 10 MMHG
BH CV ECHO MEAS - RVSP: 28.4 MMHG
BH CV ECHO MEAS - SI(AO): 117.7 ML/M^2
BH CV ECHO MEAS - SI(CUBED): 34.7 ML/M^2
BH CV ECHO MEAS - SI(MOD-SP4): 19.6 ML/M^2
BH CV ECHO MEAS - SI(TEICH): 31.5 ML/M^2
BH CV ECHO MEAS - SV(AO): 252.5 ML
BH CV ECHO MEAS - SV(CUBED): 74.4 ML
BH CV ECHO MEAS - SV(MOD-SP4): 42 ML
BH CV ECHO MEAS - SV(TEICH): 67.5 ML
BH CV ECHO MEAS - TR MAX VEL: 214.5 CM/SEC
BH CV NUCLEAR PRIOR STUDY: 3
BH CV STRESS BP STAGE 1: NORMAL
BH CV STRESS BP STAGE 2: NORMAL
BH CV STRESS COMMENTS STAGE 1: NORMAL
BH CV STRESS COMMENTS STAGE 2: NORMAL
BH CV STRESS DOSE REGADENOSON STAGE 1: 0.4
BH CV STRESS DURATION MIN STAGE 1: 0
BH CV STRESS DURATION MIN STAGE 2: 4
BH CV STRESS DURATION SEC STAGE 1: 10
BH CV STRESS DURATION SEC STAGE 2: 0
BH CV STRESS HR STAGE 1: 87
BH CV STRESS HR STAGE 2: 60
BH CV STRESS PROTOCOL 1: NORMAL
BH CV STRESS RECOVERY BP: NORMAL MMHG
BH CV STRESS RECOVERY HR: 60 BPM
BH CV STRESS STAGE 1: 1
BH CV STRESS STAGE 2: 2
MAXIMAL PREDICTED HEART RATE: 178 BPM
MAXIMAL PREDICTED HEART RATE: 178 BPM
PERCENT MAX PREDICTED HR: 48.88 %
STRESS BASELINE BP: NORMAL MMHG
STRESS BASELINE HR: 52 BPM
STRESS PERCENT HR: 58 %
STRESS POST PEAK BP: NORMAL MMHG
STRESS POST PEAK HR: 87 BPM
STRESS TARGET HR: 151 BPM
STRESS TARGET HR: 151 BPM

## 2019-04-17 ENCOUNTER — APPOINTMENT (OUTPATIENT)
Dept: GENERAL RADIOLOGY | Facility: HOSPITAL | Age: 44
End: 2019-04-17

## 2019-04-17 ENCOUNTER — APPOINTMENT (OUTPATIENT)
Dept: CT IMAGING | Facility: HOSPITAL | Age: 44
End: 2019-04-17

## 2019-04-17 ENCOUNTER — HOSPITAL ENCOUNTER (EMERGENCY)
Facility: HOSPITAL | Age: 44
Discharge: HOME | End: 2019-04-17
Attending: FAMILY MEDICINE | Admitting: FAMILY MEDICINE

## 2019-04-17 VITALS
HEART RATE: 75 BPM | WEIGHT: 243 LBS | DIASTOLIC BLOOD PRESSURE: 92 MMHG | OXYGEN SATURATION: 97 % | RESPIRATION RATE: 20 BRPM | HEIGHT: 65 IN | SYSTOLIC BLOOD PRESSURE: 153 MMHG | BODY MASS INDEX: 40.48 KG/M2 | TEMPERATURE: 98 F

## 2019-04-17 DIAGNOSIS — R73.9 HYPERGLYCEMIA: ICD-10-CM

## 2019-04-17 DIAGNOSIS — I16.0 HYPERTENSIVE URGENCY: Primary | ICD-10-CM

## 2019-04-17 LAB
ALBUMIN SERPL-MCNC: 4.03 G/DL (ref 3.5–5.2)
ALBUMIN/GLOB SERPL: 1.2 G/DL
ALP SERPL-CCNC: 71 U/L (ref 39–117)
ALT SERPL W P-5'-P-CCNC: 63 U/L (ref 1–33)
ANION GAP SERPL CALCULATED.3IONS-SCNC: 14.3 MMOL/L
AST SERPL-CCNC: 58 U/L (ref 1–32)
BASOPHILS # BLD AUTO: 0.04 10*3/MM3 (ref 0–0.2)
BASOPHILS NFR BLD AUTO: 0.6 % (ref 0–1.5)
BILIRUB SERPL-MCNC: 0.4 MG/DL (ref 0.2–1.2)
BILIRUB UR QL STRIP: NEGATIVE
BUN BLD-MCNC: 12 MG/DL (ref 6–20)
BUN/CREAT SERPL: 18.2 (ref 7–25)
CALCIUM SPEC-SCNC: 9.5 MG/DL (ref 8.6–10.5)
CHLORIDE SERPL-SCNC: 95 MMOL/L (ref 98–107)
CLARITY UR: CLEAR
CO2 SERPL-SCNC: 21.7 MMOL/L (ref 22–29)
COLOR UR: YELLOW
CREAT BLD-MCNC: 0.66 MG/DL (ref 0.57–1)
DEPRECATED RDW RBC AUTO: 36.7 FL (ref 37–54)
EOSINOPHIL # BLD AUTO: 0.16 10*3/MM3 (ref 0–0.4)
EOSINOPHIL NFR BLD AUTO: 2.3 % (ref 0.3–6.2)
ERYTHROCYTE [DISTWIDTH] IN BLOOD BY AUTOMATED COUNT: 12.7 % (ref 12.3–15.4)
GFR SERPL CREATININE-BSD FRML MDRD: 98 ML/MIN/1.73
GLOBULIN UR ELPH-MCNC: 3.4 GM/DL
GLUCOSE BLD-MCNC: 474 MG/DL (ref 65–99)
GLUCOSE BLDC GLUCOMTR-MCNC: 343 MG/DL (ref 70–130)
GLUCOSE BLDC GLUCOMTR-MCNC: 481 MG/DL (ref 70–130)
GLUCOSE UR STRIP-MCNC: ABNORMAL MG/DL
HCT VFR BLD AUTO: 44.1 % (ref 34–46.6)
HGB BLD-MCNC: 15.4 G/DL (ref 12–15.9)
HGB UR QL STRIP.AUTO: NEGATIVE
HOLD SPECIMEN: NORMAL
HOLD SPECIMEN: NORMAL
IMM GRANULOCYTES # BLD AUTO: 0.04 10*3/MM3 (ref 0–0.05)
IMM GRANULOCYTES NFR BLD AUTO: 0.6 % (ref 0–0.5)
KETONES UR QL STRIP: NEGATIVE
LEUKOCYTE ESTERASE UR QL STRIP.AUTO: NEGATIVE
LYMPHOCYTES # BLD AUTO: 2.38 10*3/MM3 (ref 0.7–3.1)
LYMPHOCYTES NFR BLD AUTO: 33.8 % (ref 19.6–45.3)
MCH RBC QN AUTO: 28.3 PG (ref 26.6–33)
MCHC RBC AUTO-ENTMCNC: 34.9 G/DL (ref 31.5–35.7)
MCV RBC AUTO: 80.9 FL (ref 79–97)
MONOCYTES # BLD AUTO: 0.42 10*3/MM3 (ref 0.1–0.9)
MONOCYTES NFR BLD AUTO: 6 % (ref 5–12)
NEUTROPHILS # BLD AUTO: 4 10*3/MM3 (ref 1.4–7)
NEUTROPHILS NFR BLD AUTO: 56.7 % (ref 42.7–76)
NITRITE UR QL STRIP: NEGATIVE
PH UR STRIP.AUTO: <=5 [PH] (ref 5–8)
PLATELET # BLD AUTO: 185 10*3/MM3 (ref 140–450)
PMV BLD AUTO: 11.4 FL (ref 6–12)
POTASSIUM BLD-SCNC: 3.9 MMOL/L (ref 3.5–5.2)
PROT SERPL-MCNC: 7.4 G/DL (ref 6–8.5)
PROT UR QL STRIP: NEGATIVE
RBC # BLD AUTO: 5.45 10*6/MM3 (ref 3.77–5.28)
SODIUM BLD-SCNC: 131 MMOL/L (ref 136–145)
SP GR UR STRIP: >1.03 (ref 1–1.03)
TROPONIN T SERPL-MCNC: <0.01 NG/ML (ref 0–0.03)
UROBILINOGEN UR QL STRIP: ABNORMAL
WBC NRBC COR # BLD: 7.04 10*3/MM3 (ref 3.4–10.8)
WHOLE BLOOD HOLD SPECIMEN: NORMAL
WHOLE BLOOD HOLD SPECIMEN: NORMAL

## 2019-04-17 PROCEDURE — 25010000002 KETOROLAC TROMETHAMINE PER 15 MG: Performed by: PHYSICIAN ASSISTANT

## 2019-04-17 PROCEDURE — 99284 EMERGENCY DEPT VISIT MOD MDM: CPT

## 2019-04-17 PROCEDURE — 93005 ELECTROCARDIOGRAM TRACING: CPT | Performed by: FAMILY MEDICINE

## 2019-04-17 PROCEDURE — 82962 GLUCOSE BLOOD TEST: CPT

## 2019-04-17 PROCEDURE — 71045 X-RAY EXAM CHEST 1 VIEW: CPT

## 2019-04-17 PROCEDURE — 80053 COMPREHEN METABOLIC PANEL: CPT | Performed by: PHYSICIAN ASSISTANT

## 2019-04-17 PROCEDURE — 85025 COMPLETE CBC W/AUTO DIFF WBC: CPT | Performed by: PHYSICIAN ASSISTANT

## 2019-04-17 PROCEDURE — 93005 ELECTROCARDIOGRAM TRACING: CPT | Performed by: EMERGENCY MEDICINE

## 2019-04-17 PROCEDURE — 70450 CT HEAD/BRAIN W/O DYE: CPT | Performed by: RADIOLOGY

## 2019-04-17 PROCEDURE — 96375 TX/PRO/DX INJ NEW DRUG ADDON: CPT

## 2019-04-17 PROCEDURE — 25010000002 PROCHLORPERAZINE EDISYLATE PER 10 MG: Performed by: PHYSICIAN ASSISTANT

## 2019-04-17 PROCEDURE — 84484 ASSAY OF TROPONIN QUANT: CPT | Performed by: PHYSICIAN ASSISTANT

## 2019-04-17 PROCEDURE — 63710000001 INSULIN REGULAR HUMAN PER 5 UNITS: Performed by: PHYSICIAN ASSISTANT

## 2019-04-17 PROCEDURE — 93010 ELECTROCARDIOGRAM REPORT: CPT | Performed by: INTERNAL MEDICINE

## 2019-04-17 PROCEDURE — 81003 URINALYSIS AUTO W/O SCOPE: CPT | Performed by: PHYSICIAN ASSISTANT

## 2019-04-17 PROCEDURE — 96374 THER/PROPH/DIAG INJ IV PUSH: CPT

## 2019-04-17 PROCEDURE — 71045 X-RAY EXAM CHEST 1 VIEW: CPT | Performed by: RADIOLOGY

## 2019-04-17 PROCEDURE — 70450 CT HEAD/BRAIN W/O DYE: CPT

## 2019-04-17 RX ORDER — PROCHLORPERAZINE MALEATE 10 MG
10 TABLET ORAL EVERY 6 HOURS PRN
Qty: 20 TABLET | Refills: 0 | Status: SHIPPED | OUTPATIENT
Start: 2019-04-17 | End: 2022-04-06

## 2019-04-17 RX ORDER — KETOROLAC TROMETHAMINE 30 MG/ML
30 INJECTION, SOLUTION INTRAMUSCULAR; INTRAVENOUS ONCE
Status: COMPLETED | OUTPATIENT
Start: 2019-04-17 | End: 2019-04-17

## 2019-04-17 RX ORDER — SODIUM CHLORIDE 0.9 % (FLUSH) 0.9 %
10 SYRINGE (ML) INJECTION AS NEEDED
Status: DISCONTINUED | OUTPATIENT
Start: 2019-04-17 | End: 2019-04-17 | Stop reason: HOSPADM

## 2019-04-17 RX ADMIN — KETOROLAC TROMETHAMINE 30 MG: 30 INJECTION, SOLUTION INTRAMUSCULAR; INTRAVENOUS at 18:27

## 2019-04-17 RX ADMIN — PROCHLORPERAZINE EDISYLATE 10 MG: 5 INJECTION INTRAMUSCULAR; INTRAVENOUS at 18:27

## 2019-04-17 RX ADMIN — HUMAN INSULIN 5 UNITS: 100 INJECTION, SOLUTION SUBCUTANEOUS at 18:27

## 2019-04-17 RX ADMIN — SODIUM CHLORIDE 1000 ML: 9 INJECTION, SOLUTION INTRAVENOUS at 18:27

## 2019-05-27 PROBLEM — I10 HYPERTENSION: Status: ACTIVE | Noted: 2019-05-27

## 2019-05-27 PROBLEM — E66.813 CLASS 3 SEVERE OBESITY IN ADULT: Status: ACTIVE | Noted: 2019-05-27

## 2019-05-27 PROBLEM — R07.89 OTHER CHEST PAIN: Status: ACTIVE | Noted: 2019-05-27

## 2019-05-27 PROBLEM — E78.5 HYPERLIPIDEMIA LDL GOAL <100: Status: ACTIVE | Noted: 2019-05-27

## 2019-05-27 PROBLEM — E66.01 CLASS 3 SEVERE OBESITY IN ADULT (HCC): Status: ACTIVE | Noted: 2019-05-27

## 2019-05-27 PROBLEM — E11.9 DIABETES MELLITUS (HCC): Status: ACTIVE | Noted: 2019-05-27

## 2020-07-30 ENCOUNTER — TRANSCRIBE ORDERS (OUTPATIENT)
Dept: ADMINISTRATIVE | Facility: HOSPITAL | Age: 45
End: 2020-07-30

## 2020-07-30 DIAGNOSIS — M25.512 LEFT SHOULDER PAIN, UNSPECIFIED CHRONICITY: Primary | ICD-10-CM

## 2020-08-13 ENCOUNTER — APPOINTMENT (OUTPATIENT)
Dept: MRI IMAGING | Facility: HOSPITAL | Age: 45
End: 2020-08-13

## 2021-05-18 ENCOUNTER — HOSPITAL ENCOUNTER (OUTPATIENT)
Dept: MAMMOGRAPHY | Facility: HOSPITAL | Age: 46
Discharge: HOME | End: 2021-05-18
Admitting: NURSE PRACTITIONER

## 2021-05-18 DIAGNOSIS — Z12.31 VISIT FOR SCREENING MAMMOGRAM: ICD-10-CM

## 2021-05-18 PROCEDURE — 77063 BREAST TOMOSYNTHESIS BI: CPT | Performed by: RADIOLOGY

## 2021-05-18 PROCEDURE — 77063 BREAST TOMOSYNTHESIS BI: CPT

## 2021-05-18 PROCEDURE — 77067 SCR MAMMO BI INCL CAD: CPT

## 2021-05-18 PROCEDURE — 77067 SCR MAMMO BI INCL CAD: CPT | Performed by: RADIOLOGY

## 2021-07-07 ENCOUNTER — TRANSCRIBE ORDERS (OUTPATIENT)
Dept: ADMINISTRATIVE | Facility: HOSPITAL | Age: 46
End: 2021-07-07

## 2021-07-07 ENCOUNTER — HOSPITAL ENCOUNTER (OUTPATIENT)
Dept: GENERAL RADIOLOGY | Facility: HOSPITAL | Age: 46
Discharge: HOME | End: 2021-07-07
Admitting: NURSE PRACTITIONER

## 2021-07-07 DIAGNOSIS — M79.601 RIGHT ARM PAIN: Primary | ICD-10-CM

## 2021-07-07 DIAGNOSIS — M79.601 RIGHT ARM PAIN: ICD-10-CM

## 2021-07-07 PROCEDURE — 73090 X-RAY EXAM OF FOREARM: CPT | Performed by: RADIOLOGY

## 2021-07-07 PROCEDURE — 73060 X-RAY EXAM OF HUMERUS: CPT

## 2021-07-07 PROCEDURE — 73090 X-RAY EXAM OF FOREARM: CPT

## 2021-07-07 PROCEDURE — 73060 X-RAY EXAM OF HUMERUS: CPT | Performed by: RADIOLOGY

## 2021-07-13 ENCOUNTER — TRANSCRIBE ORDERS (OUTPATIENT)
Dept: ADMINISTRATIVE | Facility: HOSPITAL | Age: 46
End: 2021-07-13

## 2021-07-13 DIAGNOSIS — IMO0002 TYPE II DIABETES MELLITUS WITH COMPLICATION, UNCONTROLLED: Primary | ICD-10-CM

## 2021-07-21 ENCOUNTER — HOSPITAL ENCOUNTER (EMERGENCY)
Facility: HOSPITAL | Age: 46
Discharge: HOME | End: 2021-07-21
Attending: FAMILY MEDICINE | Admitting: FAMILY MEDICINE

## 2021-07-21 ENCOUNTER — APPOINTMENT (OUTPATIENT)
Dept: GENERAL RADIOLOGY | Facility: HOSPITAL | Age: 46
End: 2021-07-21

## 2021-07-21 VITALS
BODY MASS INDEX: 37.49 KG/M2 | DIASTOLIC BLOOD PRESSURE: 88 MMHG | WEIGHT: 225 LBS | OXYGEN SATURATION: 100 % | RESPIRATION RATE: 18 BRPM | TEMPERATURE: 98.1 F | HEART RATE: 62 BPM | HEIGHT: 65 IN | SYSTOLIC BLOOD PRESSURE: 168 MMHG

## 2021-07-21 DIAGNOSIS — M94.0 COSTOCHONDRITIS, ACUTE: Primary | ICD-10-CM

## 2021-07-21 LAB
ALBUMIN SERPL-MCNC: 3.92 G/DL (ref 3.5–5.2)
ALBUMIN/GLOB SERPL: 1.3 G/DL
ALP SERPL-CCNC: 61 U/L (ref 39–117)
ALT SERPL W P-5'-P-CCNC: 28 U/L (ref 1–33)
ANION GAP SERPL CALCULATED.3IONS-SCNC: 10 MMOL/L (ref 5–15)
AST SERPL-CCNC: 26 U/L (ref 1–32)
BASOPHILS # BLD AUTO: 0.04 10*3/MM3 (ref 0–0.2)
BASOPHILS NFR BLD AUTO: 0.4 % (ref 0–1.5)
BILIRUB SERPL-MCNC: 0.6 MG/DL (ref 0–1.2)
BILIRUB UR QL STRIP: NEGATIVE
BUN SERPL-MCNC: 10 MG/DL (ref 6–20)
BUN/CREAT SERPL: 16.4 (ref 7–25)
CALCIUM SPEC-SCNC: 9.5 MG/DL (ref 8.6–10.5)
CHLORIDE SERPL-SCNC: 103 MMOL/L (ref 98–107)
CLARITY UR: CLEAR
CO2 SERPL-SCNC: 26 MMOL/L (ref 22–29)
COLOR UR: YELLOW
CREAT SERPL-MCNC: 0.61 MG/DL (ref 0.57–1)
CRP SERPL-MCNC: 0.53 MG/DL (ref 0–0.5)
D DIMER PPP FEU-MCNC: <0.27 MCGFEU/ML (ref 0–0.5)
DEPRECATED RDW RBC AUTO: 35.2 FL (ref 37–54)
EOSINOPHIL # BLD AUTO: 0.23 10*3/MM3 (ref 0–0.4)
EOSINOPHIL NFR BLD AUTO: 2.5 % (ref 0.3–6.2)
ERYTHROCYTE [DISTWIDTH] IN BLOOD BY AUTOMATED COUNT: 11.9 % (ref 12.3–15.4)
GFR SERPL CREATININE-BSD FRML MDRD: 106 ML/MIN/1.73
GLOBULIN UR ELPH-MCNC: 3.1 GM/DL
GLUCOSE SERPL-MCNC: 201 MG/DL (ref 65–99)
GLUCOSE UR STRIP-MCNC: NEGATIVE MG/DL
HCT VFR BLD AUTO: 39.2 % (ref 34–46.6)
HGB BLD-MCNC: 13.9 G/DL (ref 12–15.9)
HGB UR QL STRIP.AUTO: NEGATIVE
IMM GRANULOCYTES # BLD AUTO: 0.03 10*3/MM3 (ref 0–0.05)
IMM GRANULOCYTES NFR BLD AUTO: 0.3 % (ref 0–0.5)
KETONES UR QL STRIP: NEGATIVE
LEUKOCYTE ESTERASE UR QL STRIP.AUTO: NEGATIVE
LYMPHOCYTES # BLD AUTO: 2.27 10*3/MM3 (ref 0.7–3.1)
LYMPHOCYTES NFR BLD AUTO: 24.5 % (ref 19.6–45.3)
MAGNESIUM SERPL-MCNC: 1.8 MG/DL (ref 1.6–2.6)
MCH RBC QN AUTO: 29 PG (ref 26.6–33)
MCHC RBC AUTO-ENTMCNC: 35.5 G/DL (ref 31.5–35.7)
MCV RBC AUTO: 81.7 FL (ref 79–97)
MONOCYTES # BLD AUTO: 0.65 10*3/MM3 (ref 0.1–0.9)
MONOCYTES NFR BLD AUTO: 7 % (ref 5–12)
NEUTROPHILS NFR BLD AUTO: 6.04 10*3/MM3 (ref 1.7–7)
NEUTROPHILS NFR BLD AUTO: 65.3 % (ref 42.7–76)
NITRITE UR QL STRIP: NEGATIVE
NRBC BLD AUTO-RTO: 0 /100 WBC (ref 0–0.2)
NT-PROBNP SERPL-MCNC: 253.7 PG/ML (ref 0–450)
PH UR STRIP.AUTO: 6 [PH] (ref 5–8)
PLATELET # BLD AUTO: 183 10*3/MM3 (ref 140–450)
PMV BLD AUTO: 10.6 FL (ref 6–12)
POTASSIUM SERPL-SCNC: 4.2 MMOL/L (ref 3.5–5.2)
PROT SERPL-MCNC: 7 G/DL (ref 6–8.5)
PROT UR QL STRIP: NEGATIVE
RBC # BLD AUTO: 4.8 10*6/MM3 (ref 3.77–5.28)
SODIUM SERPL-SCNC: 139 MMOL/L (ref 136–145)
SP GR UR STRIP: 1.02 (ref 1–1.03)
TROPONIN T SERPL-MCNC: <0.01 NG/ML (ref 0–0.03)
TROPONIN T SERPL-MCNC: <0.01 NG/ML (ref 0–0.03)
TSH SERPL DL<=0.05 MIU/L-ACNC: 0.93 UIU/ML (ref 0.27–4.2)
UROBILINOGEN UR QL STRIP: NORMAL
WBC # BLD AUTO: 9.26 10*3/MM3 (ref 3.4–10.8)

## 2021-07-21 PROCEDURE — 99283 EMERGENCY DEPT VISIT LOW MDM: CPT

## 2021-07-21 PROCEDURE — 25010000002 PROCHLORPERAZINE 10 MG/2ML SOLUTION: Performed by: FAMILY MEDICINE

## 2021-07-21 PROCEDURE — 80053 COMPREHEN METABOLIC PANEL: CPT | Performed by: FAMILY MEDICINE

## 2021-07-21 PROCEDURE — 85025 COMPLETE CBC W/AUTO DIFF WBC: CPT | Performed by: FAMILY MEDICINE

## 2021-07-21 PROCEDURE — 83735 ASSAY OF MAGNESIUM: CPT | Performed by: FAMILY MEDICINE

## 2021-07-21 PROCEDURE — 96375 TX/PRO/DX INJ NEW DRUG ADDON: CPT

## 2021-07-21 PROCEDURE — 93005 ELECTROCARDIOGRAM TRACING: CPT | Performed by: FAMILY MEDICINE

## 2021-07-21 PROCEDURE — 84484 ASSAY OF TROPONIN QUANT: CPT | Performed by: FAMILY MEDICINE

## 2021-07-21 PROCEDURE — 83880 ASSAY OF NATRIURETIC PEPTIDE: CPT | Performed by: FAMILY MEDICINE

## 2021-07-21 PROCEDURE — 36415 COLL VENOUS BLD VENIPUNCTURE: CPT

## 2021-07-21 PROCEDURE — 96374 THER/PROPH/DIAG INJ IV PUSH: CPT

## 2021-07-21 PROCEDURE — 84443 ASSAY THYROID STIM HORMONE: CPT | Performed by: FAMILY MEDICINE

## 2021-07-21 PROCEDURE — 86140 C-REACTIVE PROTEIN: CPT | Performed by: FAMILY MEDICINE

## 2021-07-21 PROCEDURE — 25010000002 MORPHINE PER 10 MG: Performed by: FAMILY MEDICINE

## 2021-07-21 PROCEDURE — 71045 X-RAY EXAM CHEST 1 VIEW: CPT | Performed by: RADIOLOGY

## 2021-07-21 PROCEDURE — 85379 FIBRIN DEGRADATION QUANT: CPT | Performed by: FAMILY MEDICINE

## 2021-07-21 PROCEDURE — 71045 X-RAY EXAM CHEST 1 VIEW: CPT

## 2021-07-21 PROCEDURE — 81003 URINALYSIS AUTO W/O SCOPE: CPT | Performed by: FAMILY MEDICINE

## 2021-07-21 PROCEDURE — 93010 ELECTROCARDIOGRAM REPORT: CPT | Performed by: INTERNAL MEDICINE

## 2021-07-21 PROCEDURE — 99284 EMERGENCY DEPT VISIT MOD MDM: CPT

## 2021-07-21 PROCEDURE — 25010000002 METHYLPREDNISOLONE PER 40 MG: Performed by: FAMILY MEDICINE

## 2021-07-21 RX ORDER — METHYLPREDNISOLONE SODIUM SUCCINATE 40 MG/ML
40 INJECTION, POWDER, LYOPHILIZED, FOR SOLUTION INTRAMUSCULAR; INTRAVENOUS ONCE
Status: COMPLETED | OUTPATIENT
Start: 2021-07-21 | End: 2021-07-21

## 2021-07-21 RX ORDER — HYDROCODONE BITARTRATE AND ACETAMINOPHEN 5; 325 MG/1; MG/1
1 TABLET ORAL EVERY 6 HOURS PRN
Qty: 12 TABLET | Refills: 0 | Status: SHIPPED | OUTPATIENT
Start: 2021-07-21 | End: 2022-04-06

## 2021-07-21 RX ORDER — PROCHLORPERAZINE EDISYLATE 5 MG/ML
5 INJECTION INTRAMUSCULAR; INTRAVENOUS ONCE
Status: COMPLETED | OUTPATIENT
Start: 2021-07-21 | End: 2021-07-21

## 2021-07-21 RX ORDER — SODIUM CHLORIDE 0.9 % (FLUSH) 0.9 %
10 SYRINGE (ML) INJECTION AS NEEDED
Status: DISCONTINUED | OUTPATIENT
Start: 2021-07-21 | End: 2021-07-21 | Stop reason: HOSPADM

## 2021-07-21 RX ADMIN — METHYLPREDNISOLONE SODIUM SUCCINATE 40 MG: 40 INJECTION, POWDER, FOR SOLUTION INTRAMUSCULAR; INTRAVENOUS at 12:42

## 2021-07-21 RX ADMIN — PROCHLORPERAZINE EDISYLATE 5 MG: 5 INJECTION INTRAMUSCULAR; INTRAVENOUS at 09:17

## 2021-07-21 RX ADMIN — MORPHINE SULFATE 4 MG: 4 INJECTION, SOLUTION INTRAMUSCULAR; INTRAVENOUS at 09:16

## 2021-07-21 NOTE — ED PROVIDER NOTES
Subjective   Patient is a 46-year-old female who presents the emergency department for sudden onset of chest pain that started this morning.  The patient states that she woke up around 6:45 AM with this pain, she states that she feels it on the left side of her chest and it is radiating down her left arm.  She states that the pain is so severe that she feels like she cannot take a deep breath.  She does not have any personal cardiac history but states that she has a family history of cardiac issues at a young age.  The patient denies any nausea or vomiting but states that she is having a headache, weakness, and states that she feels short of breath because she cannot take a deep breath.  The patient denies any fever, chills or recent illness.  She denies any back pain abdominal pain or additional symptoms at this time.          Review of Systems   Constitutional: Negative for activity change, appetite change, chills, diaphoresis, fatigue and fever.   HENT: Negative for congestion, postnasal drip, rhinorrhea, sinus pressure, sinus pain, sneezing, sore throat and tinnitus.    Eyes: Negative for pain and discharge.   Respiratory: Positive for shortness of breath. Negative for apnea, cough, choking and wheezing.    Cardiovascular: Positive for chest pain. Negative for palpitations and leg swelling.   Gastrointestinal: Negative for abdominal distention, abdominal pain, constipation, diarrhea, nausea and vomiting.   Genitourinary: Negative for difficulty urinating and flank pain.   Musculoskeletal: Negative for arthralgias and back pain.   Neurological: Negative for dizziness and light-headedness.   Psychiatric/Behavioral: Negative for agitation and confusion.       Past Medical History:   Diagnosis Date   • Depression    • Diabetes mellitus (CMS/HCC)    • Hypertension    • Uterine cancer (CMS/HCC)     2008       No Known Allergies    Past Surgical History:   Procedure Laterality Date   • ABDOMINAL SURGERY     • BREAST  BIOPSY Left    •  SECTION     • CHOLECYSTECTOMY     • HYSTERECTOMY         Family History   Problem Relation Age of Onset   • No Known Problems Mother    • No Known Problems Father    • No Known Problems Sister    • No Known Problems Brother    • No Known Problems Son    • No Known Problems Daughter    • No Known Problems Maternal Grandmother    • No Known Problems Maternal Grandfather    • No Known Problems Paternal Grandmother    • No Known Problems Paternal Grandfather    • No Known Problems Cousin    • Breast cancer Maternal Aunt    • Rheum arthritis Neg Hx    • Osteoarthritis Neg Hx    • Asthma Neg Hx    • Diabetes Neg Hx    • Heart failure Neg Hx    • Hyperlipidemia Neg Hx    • Hypertension Neg Hx    • Migraines Neg Hx    • Rashes / Skin problems Neg Hx    • Seizures Neg Hx    • Stroke Neg Hx    • Thyroid disease Neg Hx        Social History     Socioeconomic History   • Marital status:      Spouse name: Not on file   • Number of children: Not on file   • Years of education: Not on file   • Highest education level: Not on file   Tobacco Use   • Smoking status: Never Smoker   • Smokeless tobacco: Never Used   Substance and Sexual Activity   • Alcohol use: No   • Drug use: No   • Sexual activity: Defer           Objective   Physical Exam  Vitals and nursing note reviewed.   Constitutional:       General: She is not in acute distress.     Appearance: She is well-developed and normal weight. She is not ill-appearing, toxic-appearing or diaphoretic.      Interventions: She is not intubated.  HENT:      Head: Normocephalic.      Mouth/Throat:      Mouth: Mucous membranes are moist.      Pharynx: No pharyngeal swelling or oropharyngeal exudate.   Eyes:      Pupils: Pupils are equal, round, and reactive to light.   Neck:      Thyroid: No thyromegaly.      Vascular: No hepatojugular reflux or JVD.   Cardiovascular:      Rate and Rhythm: Normal rate and regular rhythm.  No extrasystoles are  present.     Pulses: No decreased pulses.      Heart sounds: No murmur heard.   No friction rub. No gallop.    Pulmonary:      Effort: Pulmonary effort is normal. No tachypnea, bradypnea, accessory muscle usage or respiratory distress. She is not intubated.      Breath sounds: Normal breath sounds. No stridor. No decreased breath sounds, wheezing, rhonchi or rales.   Chest:      Chest wall: No mass, deformity, tenderness or crepitus.   Abdominal:      General: Bowel sounds are normal.      Palpations: Abdomen is soft. There is no hepatomegaly, splenomegaly or mass.      Tenderness: There is no abdominal tenderness.   Musculoskeletal:      Cervical back: Normal range of motion and neck supple.      Right lower leg: No tenderness. No edema.      Left lower leg: No tenderness. No edema.   Lymphadenopathy:      Cervical: No cervical adenopathy.   Skin:     General: Skin is warm and dry.      Capillary Refill: Capillary refill takes less than 2 seconds.      Coloration: Skin is not cyanotic or pale.      Findings: No ecchymosis or erythema.   Neurological:      General: No focal deficit present.      Mental Status: She is alert.      Cranial Nerves: No cranial nerve deficit.      Motor: No weakness.   Psychiatric:         Mood and Affect: Mood normal. Mood is not anxious.         Behavior: Behavior normal. Behavior is not agitated.         Procedures           ED Course  ED Course as of Jul 21 1237 Wed Jul 21, 2021   0921 EKG is normal sinus rhythm with heart rate of 65 bpm CO interval is 142 ms QRS duration is 76 ms QT/QTc is 414/430 ms there is a possible anterior infarct age-indeterminate no acute changes noted    [EG]      ED Course User Index  [EG] Jelena Iyer DO                                           MDM  Number of Diagnoses or Management Options  Costochondritis, acute: new and requires workup     Amount and/or Complexity of Data Reviewed  Clinical lab tests: ordered and reviewed  Tests in the  radiology section of CPT®: ordered and reviewed  Tests in the medicine section of CPT®: ordered and reviewed  Independent visualization of images, tracings, or specimens: yes    Risk of Complications, Morbidity, and/or Mortality  Presenting problems: moderate  Diagnostic procedures: moderate  Management options: moderate    Patient Progress  Patient progress: stable      Final diagnoses:   Costochondritis, acute       ED Disposition  ED Disposition     ED Disposition Condition Comment    Discharge Stable           Coty Shannon, APRN  [address]  [address]  [facility] [address]  [phone]    Schedule an appointment as soon as possible for a visit in 2 days      [facility] Emergency Department  [address] [facility] [address] [phone]  Go to   If symptoms worsen         Medication List      New Prescriptions    HYDROcodone-acetaminophen 5-325 MG per tablet  Commonly known as: NORCO  Take 1 tablet by mouth Every 6 (Six) Hours As Needed for Moderate Pain .           Where to Get Your Medications      These medications were [facility] [address] [address] [facility] [address] [facility] [address] [facility] [phone] [place] [phone]   [address] [address], [facility] [address]    Phone: [phone]   · HYDROcodone-acetaminophen 5-325 MG per tablet          Jelena Iyer DO  07/21/21 7586

## 2021-07-21 NOTE — ED NOTES
Due to epic charting, unable to enter sepsis screening. Pt vitals WNL, no signs of infection present in triage, Pt screened negative at this time.      Divya Huizar RN  07/21/21 0933

## 2021-07-22 LAB
QT INTERVAL: 414 MS
QTC INTERVAL: 430 MS

## 2022-03-03 ENCOUNTER — APPOINTMENT (OUTPATIENT)
Dept: ULTRASOUND IMAGING | Facility: HOSPITAL | Age: 47
End: 2022-03-03

## 2022-03-03 ENCOUNTER — APPOINTMENT (OUTPATIENT)
Dept: MAMMOGRAPHY | Facility: HOSPITAL | Age: 47
End: 2022-03-03

## 2022-04-06 ENCOUNTER — SPECIALTY PHARMACY (OUTPATIENT)
Dept: PHARMACY | Facility: HOSPITAL | Age: 47
End: 2022-04-06

## 2022-04-06 ENCOUNTER — OFFICE VISIT (OUTPATIENT)
Dept: ENDOCRINOLOGY | Facility: CLINIC | Age: 47
End: 2022-04-06

## 2022-04-06 VITALS
HEIGHT: 65 IN | WEIGHT: 217 LBS | SYSTOLIC BLOOD PRESSURE: 152 MMHG | BODY MASS INDEX: 36.15 KG/M2 | TEMPERATURE: 97.1 F | HEART RATE: 74 BPM | DIASTOLIC BLOOD PRESSURE: 93 MMHG | OXYGEN SATURATION: 98 %

## 2022-04-06 DIAGNOSIS — E78.00 HYPERCHOLESTEROLEMIA: ICD-10-CM

## 2022-04-06 DIAGNOSIS — I10 HYPERTENSION, UNSPECIFIED TYPE: ICD-10-CM

## 2022-04-06 DIAGNOSIS — Z79.4 TYPE 2 DIABETES MELLITUS WITH HYPERGLYCEMIA, WITH LONG-TERM CURRENT USE OF INSULIN: Primary | ICD-10-CM

## 2022-04-06 DIAGNOSIS — E11.65 TYPE 2 DIABETES MELLITUS WITH HYPERGLYCEMIA, WITH LONG-TERM CURRENT USE OF INSULIN: Primary | ICD-10-CM

## 2022-04-06 LAB — GLUCOSE BLDC GLUCOMTR-MCNC: 413 MG/DL (ref 70–130)

## 2022-04-06 PROCEDURE — 99204 OFFICE O/P NEW MOD 45 MIN: CPT | Performed by: NURSE PRACTITIONER

## 2022-04-06 PROCEDURE — 82962 GLUCOSE BLOOD TEST: CPT | Performed by: NURSE PRACTITIONER

## 2022-04-06 RX ORDER — ERGOCALCIFEROL 1.25 MG/1
1 CAPSULE ORAL WEEKLY
COMMUNITY
Start: 2022-03-08

## 2022-04-06 RX ORDER — EZETIMIBE 10 MG/1
10 TABLET ORAL DAILY
Qty: 30 TABLET | Refills: 1 | Status: SHIPPED | OUTPATIENT
Start: 2022-04-06

## 2022-04-06 RX ORDER — CLONIDINE HYDROCHLORIDE 0.1 MG/1
0.1 TABLET ORAL 2 TIMES DAILY PRN
COMMUNITY

## 2022-04-06 RX ORDER — METHOCARBAMOL 500 MG/1
500 TABLET, FILM COATED ORAL 4 TIMES DAILY PRN
COMMUNITY

## 2022-04-06 RX ORDER — DULOXETIN HYDROCHLORIDE 30 MG/1
30 CAPSULE, DELAYED RELEASE ORAL DAILY
Qty: 30 CAPSULE | Refills: 1 | Status: SHIPPED | OUTPATIENT
Start: 2022-04-06

## 2022-04-06 RX ORDER — HYDROXYZINE HYDROCHLORIDE 10 MG/1
10 TABLET, FILM COATED ORAL 3 TIMES DAILY PRN
COMMUNITY

## 2022-04-06 RX ORDER — FLUTICASONE PROPIONATE 50 MCG
1-2 SPRAY, SUSPENSION (ML) NASAL AS NEEDED
COMMUNITY
Start: 2022-03-08

## 2022-04-06 RX ORDER — AMLODIPINE BESYLATE 10 MG/1
10 TABLET ORAL DAILY
COMMUNITY
End: 2022-04-06

## 2022-04-06 RX ORDER — MELOXICAM 7.5 MG/1
7.5 TABLET ORAL DAILY PRN
COMMUNITY

## 2022-04-06 RX ORDER — INSULIN GLARGINE 100 [IU]/ML
25 INJECTION, SOLUTION SUBCUTANEOUS NIGHTLY
COMMUNITY
End: 2022-05-09

## 2022-04-06 NOTE — ASSESSMENT & PLAN NOTE
Lipid abnormalities are newly identified.  Pharmacotherapy as ordered.   Start on Zetia.  Lipids will be reassessed in 6 months.

## 2022-04-06 NOTE — PROGRESS NOTES
Chief Complaint   Patient presents with   • Diabetes     Initial encounter        Referring Provider  Nat Pascual APRN HPI   Janet Hurtado is a 47 y.o. female had concerns including Diabetes (Initial encounter).    Seen as a new patient today.    Diabetes was diagnosed 10 years ago.  Complications include pain/tingling/burning in her legs, ankles, feet and fingertips, had pressure in her eyes at her most recent eye exam, has seen nephrology and all was well.  Last ophtho exam was Feb 2022, [facility].  Current medications for diabetes include nothing for the last 2 weeks.  The insulin that she had at home has been out of date the last couple weeks.  She was taking 50 units Lantus at night, Humalog 42 units with meals prior to her insulin expiring.  She has had issues with getting her medication due to increased cost on her co-pays.  We will try to get her assistance with this coverage.  Previous meds: GLP-1-GI Issues, SGLT-2-yeast infections, Metformin-GI Issues, glipizide-stopped when started insulin  She checks her blood sugar up to 3 times per day.   Hypos: never    ACE/ARB:yes, Statin: No  Labs:  A1C: 2/22/2022: 11.9   Lipid Panel: Up-to-date  ZANA: Needs done    The following portions of the patient's history were reviewed and updated as appropriate: allergies, current medications, past family history, past medical history, past social history, past surgical history and problem list.    She states that her diet is not great at this moment.  At one point she was on a keto and had her blood sugars on the 100s but since her nephew passed away over a year ago she has let herself go and has not been taking care of her personal medical needs.    Past Medical History:   Diagnosis Date   • Depression    • Diabetes mellitus (HCC)    • Fibromyalgia    • Hypertension    • Uterine cancer (HCC)     2008     Past Surgical History:   Procedure Laterality Date   • ABDOMINAL SURGERY     • BREAST BIOPSY Left 2014   •   SECTION     • CHOLECYSTECTOMY     • HYSTERECTOMY        Family History   Problem Relation Age of Onset   • No Known Problems Mother    • No Known Problems Father    • No Known Problems Sister    • No Known Problems Brother    • No Known Problems Son    • No Known Problems Daughter    • No Known Problems Maternal Grandmother    • No Known Problems Maternal Grandfather    • No Known Problems Paternal Grandmother    • No Known Problems Paternal Grandfather    • No Known Problems Cousin    • Breast cancer Maternal Aunt    • Rheum arthritis Neg Hx    • Osteoarthritis Neg Hx    • Asthma Neg Hx    • Diabetes Neg Hx    • Heart failure Neg Hx    • Hyperlipidemia Neg Hx    • Hypertension Neg Hx    • Migraines Neg Hx    • Rashes / Skin problems Neg Hx    • Seizures Neg Hx    • Stroke Neg Hx    • Thyroid disease Neg Hx       Social History     Socioeconomic History   • Marital status:    Tobacco Use   • Smoking status: Never Smoker   • Smokeless tobacco: Never Used   Vaping Use   • Vaping Use: Never used   Substance and Sexual Activity   • Alcohol use: No   • Drug use: No   • Sexual activity: Defer      No Known Allergies   Current Outpatient Medications on File Prior to Visit   Medication Sig Dispense Refill   • lisinopril (PRINIVIL,ZESTRIL) 20 MG tablet Take 20 mg by mouth Daily.     • ondansetron ODT (ZOFRAN-ODT) 4 MG disintegrating tablet Take 1 tablet by mouth Every 6 (Six) Hours As Needed for nausea or vomiting. 10 tablet 0   • [DISCONTINUED] escitalopram (LEXAPRO) 5 MG tablet Take 5 mg by mouth Daily.     • [DISCONTINUED] glipiZIDE (GLUCOTROL) 5 MG tablet Take 5 mg by mouth 2 (Two) Times a Day Before Meals.     • [DISCONTINUED] HYDROcodone-acetaminophen (NORCO) 5-325 MG per tablet Take 1 tablet by mouth Every 6 (Six) Hours As Needed for Moderate Pain . 12 tablet 0   • [DISCONTINUED] insulin aspart (novoLOG) 100 UNIT/ML injection Inject 40 Units under the skin into the appropriate area as directed 3  "(Three) Times a Day Before Meals.     • [DISCONTINUED] insulin glargine (LANTUS) 100 UNIT/ML injection Inject 80 Units under the skin into the appropriate area as directed Daily.     • [DISCONTINUED] metFORMIN (GLUCOPHAGE) 1000 MG tablet Take 1,000 mg by mouth 2 (Two) Times a Day With Meals.     • [DISCONTINUED] prochlorperazine (COMPAZINE) 10 MG tablet Take 1 tablet by mouth Every 6 (Six) Hours As Needed for Nausea or Vomiting. 20 tablet 0   • [DISCONTINUED] promethazine (PHENERGAN) 25 MG suppository Insert 1 suppository into the rectum Every 6 (Six) Hours As Needed for nausea or vomiting. 10 suppository 0     No current facility-administered medications on file prior to visit.        Review of Systems   Constitutional: Positive for activity change, appetite change, fatigue and unexpected weight loss.   Eyes: Positive for blurred vision.   Gastrointestinal: Positive for abdominal pain.   Endocrine: Positive for polydipsia and polyuria.   Neurological: Positive for numbness.   Psychiatric/Behavioral: Positive for sleep disturbance.   All other systems reviewed and are negative.    /93 (BP Location: Left arm, Patient Position: Sitting, Cuff Size: Adult)   Pulse 74   Temp 97.1 °F (36.2 °C) (Infrared)   Ht 165.1 cm (65\")   Wt 98.4 kg (217 lb)   LMP  (LMP Unknown)   SpO2 98%   Breastfeeding No   BMI 36.11 kg/m²      Physical Exam  Vitals reviewed.   Constitutional:       Appearance: Normal appearance.   Eyes:      Extraocular Movements: Extraocular movements intact.   Neck:      Thyroid: No thyroid mass, thyromegaly or thyroid tenderness.      Comments: No palpable nodules.  Cardiovascular:      Rate and Rhythm: Normal rate and regular rhythm.      Pulses: Normal pulses.           Dorsalis pedis pulses are 2+ on the right side and 2+ on the left side.        Posterior tibial pulses are 2+ on the right side and 2+ on the left side.      Heart sounds: Normal heart sounds.   Pulmonary:      Effort: Pulmonary " effort is normal.      Breath sounds: Normal breath sounds.   Feet:      Right foot:      Protective Sensation: 10 sites tested. 5 sites sensed.      Skin integrity: Dry skin present.      Toenail Condition: Right toenails are normal.      Left foot:      Protective Sensation: 10 sites tested. 5 sites sensed.      Skin integrity: Dry skin present.      Toenail Condition: Left toenails are normal.      Comments: Diabetic Foot Exam Performed and Monofilament Test Performed  Skin:     General: Skin is warm.   Neurological:      Mental Status: She is alert and oriented to person, place, and time.   Psychiatric:         Mood and Affect: Mood normal.         Behavior: Behavior normal.         Thought Content: Thought content normal.         Judgment: Judgment normal.       CMP:  Lab Results   Component Value Date    BUN 10 07/21/2021    CREATININE 0.61 07/21/2021    EGFRIFNONA 106 07/21/2021    BCR 16.4 07/21/2021     07/21/2021    K 4.2 07/21/2021    CO2 26.0 07/21/2021    CALCIUM 9.5 07/21/2021    ALBUMIN 3.92 07/21/2021    BILITOT 0.6 07/21/2021    ALKPHOS 61 07/21/2021    AST 26 07/21/2021    ALT 28 07/21/2021     Lipid Panel:  No results found for: CHOL, TRIG, HDL, VLDL, LDL  HbA1c:  Lab Results   Component Value Date    HGBA1C 10.20 (H) 09/24/2018     Glucose:  Lab Results   Component Value Date    POCGLU 413 (A) 04/06/2022     Microalbumin:  No results found for: MALBCRERATIO  TSH:  Lab Results   Component Value Date    TSH 0.928 07/21/2021       Assessment and Plan    Diagnoses and all orders for this visit:    1. Type 2 diabetes mellitus with hyperglycemia, with long-term current use of insulin (HCC) (Primary)  Assessment & Plan:  -Diabetes is above goal with A1c> 11.  -Discussed in length with the patient the dietary and exercise guidelines.  She would like to speak with diabetes education.  Will place order for this.  -Discussed importance of yearly eye exams.  She will keep the scheduled  appointments.  -Discussed importance of checking blood sugar daily.  She would like to try CGM.  We will send in prescription.  -Discussed importance of notifying office if her insulin is running low or out of date.  Discussed the importance of taking her medication regularly and not missing doses to keep her blood sugars under control.  -Start Tresiba 25 units at night.  Check your fasting blood sugar and do a 3-day average, if that number is above 120, increase by 2 units every 3 days. Max out at 50 units nightly.  Samples given in office.  -Start Fiasp 20 units with each meal.  Samples given in office.  -Due to patient having symptoms of neuropathy, will start on low-dose Cymbalta.  -Follow-up when A1c due again for medication adjustment.    Orders:  -     POC Glucose Fingerstick  -     Ambulatory Referral to Diabetic Education  -     Microalbumin / Creatinine Urine Ratio - Urine, Clean Catch  -     TSH  -     T4, Free    2. Hypertension, unspecified type  Assessment & Plan:  Discussed importance of taking medication regularly.  She is to start back on lisinopril 20mg QD.  Follow-up at next appointment.      3. Hypercholesterolemia  Assessment & Plan:  Lipid abnormalities are newly identified.  Pharmacotherapy as ordered.   Start on Zetia.  Lipids will be reassessed in 6 months.      Other orders  -     Continuous Blood Gluc Sensor (FreeStyle Damon 2 Sensor) misc; 1 each Every 14 (Fourteen) Days.  Dispense: 2 each; Refill: 1  -     DULoxetine (CYMBALTA) 30 MG capsule; Take 1 capsule by mouth Daily.  Dispense: 30 capsule; Refill: 1  -     ezetimibe (Zetia) 10 MG tablet; Take 1 tablet by mouth Daily.  Dispense: 30 tablet; Refill: 1       Return in about 7 weeks (around 5/22/2022) for Follow-up appointment, A1C. The patient was instructed to contact the clinic with any interval questions or concerns.    HAVEN Montelongo   Endocrinology

## 2022-04-06 NOTE — PATIENT INSTRUCTIONS
Tresiba 25 units at night.  Check your fasting blood sugar and do a 3-day average, if that number is above 120, increase by 2 units every 3 days. Max out at 50 units nightly.  Fiasp 20 units with each meal.

## 2022-04-06 NOTE — PROGRESS NOTES
Specialty Pharmacy Patient Management Program  Endocrinology Initial Assessment       Janet Hurtado is a 47 y.o. female with Type 2 Diabetes seen by an Endocrinology provider and enrolled in the Endocrinology Patient Management program offered by [facility].  An initial outreach was conducted, including assessment of therapy appropriateness and specialty medication education for insulin therapy. The patient was introduced to services offered by [facility], including: regular assessments, refill coordination, curbside pick-up or mail order delivery options, prior authorization maintenance, and financial assistance programs as applicable. The patient was also provided with contact information for the pharmacy team.     Patient is not currently taking any medications for diabetes. Her nephew recently passed away which has put the patient under a lot of stress. She was taking Humalog 42 units with meals and Basaglar 50 units at night. She hasn't taken any insulin since last month (around 2 weeks ago) as her insulin was .     Previous medications include metformin (GI issues), Trulicity (GI issues), glipizide (stopped when started on insulin), and Jardiance (high co-pay, never picked-up). Patient denies a FH of thyroid cancer and pancreas issues. She has a history of recurrent yeast infections for which she is prescribed diflucan.     Insurance Coverage & Financial Support  Patient is requesting assistance   Insulin Co-pays: Tresiba $125, Toujeo $115, Levemir $125, Humalog $125. Lantus, novolog, and fiasp are not covered on her insurance   CGM: FreeStyle Damon sensors $35    Relevant Past Medical History and Comorbidities  Relevant medical history and concomitant health conditions were discussed with the patient. The patient's chart has been reviewed for relevant past medical history and comorbid health conditions and updated as necessary.   Past Medical  History:   Diagnosis Date   • Depression    • Diabetes mellitus (HCC)    • Fibromyalgia    • Hypertension    • Uterine cancer (HCC)     2008     Social History     Socioeconomic History   • Marital status:    Tobacco Use   • Smoking status: Never Smoker   • Smokeless tobacco: Never Used   Vaping Use   • Vaping Use: Never used   Substance and Sexual Activity   • Alcohol use: No   • Drug use: No   • Sexual activity: Defer       Allergies  Known allergies and reactions were discussed with the patient. The patient's chart has been reviewed for  allergy information and updated as necessary.   Patient has no known allergies.    Current Medication List  This medication list has been reviewed with the patient and evaluated for any interactions or necessary modifications/recommendations, and updated to include all prescription medications, OTC medications, and supplements the patient is currently taking.  This list reflects what is contained in the patient's profile, which has also been marked as reviewed to communicate to other providers it is the most up to date version of the patient's current medication therapy.     Current Outpatient Medications:   •  cloNIDine (CATAPRES) 0.1 MG tablet, Take 0.1 mg by mouth 2 (Two) Times a Day As Needed for High Blood Pressure., Disp: , Rfl:   •  ergocalciferol (ERGOCALCIFEROL) 1.25 MG (77515 UT) capsule, Take 1 capsule by mouth 1 (One) Time Per Week., Disp: , Rfl:   •  fluticasone (FLONASE) 50 MCG/ACT nasal spray, 1-2 sprays into the nostril(s) as directed by provider As Needed., Disp: , Rfl:   •  hydrOXYzine (ATARAX) 10 MG tablet, Take 10 mg by mouth 3 (Three) Times a Day As Needed for Itching., Disp: , Rfl:   •  Insulin Glargine (BASAGLAR KWIKPEN) 100 UNIT/ML injection pen, Inject 25 Units under the skin into the appropriate area as directed Every Night. She takes 50 units nightly, Disp: , Rfl:   •  lisinopril (PRINIVIL,ZESTRIL) 20 MG tablet, Take 20 mg by mouth Daily., Disp:  , Rfl:   •  meloxicam (MOBIC) 7.5 MG tablet, Take 7.5 mg by mouth Daily As Needed., Disp: , Rfl:   •  methocarbamol (ROBAXIN) 500 MG tablet, Take 500 mg by mouth 4 (Four) Times a Day As Needed for Muscle Spasms., Disp: , Rfl:   •  ondansetron ODT (ZOFRAN-ODT) 4 MG disintegrating tablet, Take 1 tablet by mouth Every 6 (Six) Hours As Needed for nausea or vomiting., Disp: 10 tablet, Rfl: 0  •  Continuous Blood Gluc Sensor (FreeStyle Damon 2 Sensor) misc, 1 each Every 14 (Fourteen) Days., Disp: 2 each, Rfl: 1  •  DULoxetine (CYMBALTA) 30 MG capsule, Take 1 capsule by mouth Daily., Disp: 30 capsule, Rfl: 1  •  ezetimibe (Zetia) 10 MG tablet, Take 1 tablet by mouth Daily., Disp: 30 tablet, Rfl: 1  •  insulin lispro (humaLOG) 100 UNIT/ML injection, Inject 25 Units under the skin into the appropriate area as directed 3 (Three) Times a Day Before Meals., Disp: , Rfl:     Drug Interactions  None relevant      Recommended Medications Assessment  • Aspirin - not indicated  • Statin - not taking  • ACEi/ARB - currently taking     Current 10-Year ASCVD Risk: 7.5%    Relevant Laboratory Values    Lab Results   Component Value Date    HGBA1C 10.20 (H) 09/24/2018     Lab Results   Component Value Date    GLUCOSE 201 (H) 07/21/2021    CALCIUM 9.5 07/21/2021     07/21/2021    K 4.2 07/21/2021    CO2 26.0 07/21/2021     07/21/2021    BUN 10 07/21/2021    CREATININE 0.61 07/21/2021    EGFRIFNONA 106 07/21/2021    BCR 16.4 07/21/2021    ANIONGAP 10.0 07/21/2021     No results found for: CHOL, CHLPL, TRIG, HDL, LDL, LDLDIRECT      Initial Education Provided for Specialty Medication  The patient has been provided with the following education and any applicable administration techniques (i.e. self-injection) have been demonstrated for the therapies indicated. All questions and concerns have been addressed prior to the patient receiving the medication, and the patient has verbalized understanding of the education and any  materials provided.  Additional patient education shall be provided and documented upon request by the patient, provider or payer.      Tresiba (insulin degludec)  · Administer via subcutaneous injection into the thigh, abdominal wall, or upper arm. Rotate injection sites within the same region to reduce risk of lipodystrophy.   · Administer once daily at the same time each day, without regard to food.   · If you miss a dose, administer as soon as possible and ensure at least 8 hours between consecutive injections.   · Refrigerate unopened pens in original carton until the expiration date. Opened pens in use may be stored at room temperature or in refrigeration for up to 8 weeks.     Educated patient on using Freestyle Damon device to monitor BG:  Sensors:  -Must be changed every 14 days. One month supply will include two sensors.  -Sensors are placed on back of patient's arm. Educated on application process: clean area with alcohol beforehand. Sensor placement is important and you will want to change your insertion site with each sensor. Using the same site too often might not allow the skin to heal, causing scarring or skin irritation.  Choose a site:   -At least 3 inches from any injection site  -Away from scarring, tattoos, irritation, and bones  -Unlikely to be bumped, pushed, or laid on while sleeping    Adherence and Self-Administration  • Barriers to Patient Adherence and/or Self-Administration: Cost  • Methods for Supporting Patient Adherence and/or Self-Administration: helping patient find patient assistance    Vaccination Status:   COVID 19: not vaccinated  Influenza: not vaccinated  Pneumococcal: not vaccinated  Hep B: not vaccinated    Goals of Therapy  • Patient Goals of Therapy: Take medication every day   • Clinical Goals or Therapeutic Targets, If Applicable: A1C reduction <7%    Reassessment Plan & Follow-Up  1. Recent Provider Changes: Patient's diabetes is currently uncontrolled with last A1C of  11.9%. Patient isn't able to afford insulin right now and hasn't taken any in about 2 weeks. Will help patient to find assistance. She was given samples of Tresiba and Fiasp in office today. FreeStyle Damon sensor was placed in office today.   2. Pharmacist to perform regular reassessments no more than (6) months from the previous assessment.  3. Welcome information and patient satisfaction survey to be sent by retail team with patient's initial fill.  4. Care Coordinator to set up future refill outreaches, coordinate prescription delivery, and escalate clinical questions to pharmacist.     Additional Plans, Therapy Recommendations or Therapy Problems to Be Addressed:   · Patient needs to be started on medication for high cholesterol (10 year ASCVD risk score - 7.5%) and referred to a diabetes educator.    · She needs vaccinations - declined at today's visit.         Attestation  I attest that the initiated specialty medication(s) are appropriate for the patient based on my assessment.  If the prescribed therapy is at any point deemed not appropriate based on the current or future assessments, a consultation will be initiated with the patient's specialty care provider to determine the best course of action. The revised plan of therapy will be documented along with any additional patient education provided.       Marianela Duncan, PharmD  4/6/2022  10:12 EDT

## 2022-04-06 NOTE — ASSESSMENT & PLAN NOTE
Discussed importance of taking medication regularly.  She is to start back on lisinopril 20mg QD.  Follow-up at next appointment.

## 2022-04-06 NOTE — ASSESSMENT & PLAN NOTE
-Diabetes is above goal with A1c> 11.  -Discussed in length with the patient the dietary and exercise guidelines.  She would like to speak with diabetes education.  Will place order for this.  -Discussed importance of yearly eye exams.  She will keep the scheduled appointments.  -Discussed importance of checking blood sugar daily.  She would like to try CGM.  We will send in prescription.  -Discussed importance of notifying office if her insulin is running low or out of date.  Discussed the importance of taking her medication regularly and not missing doses to keep her blood sugars under control.  -Start Tresiba 25 units at night.  Check your fasting blood sugar and do a 3-day average, if that number is above 120, increase by 2 units every 3 days. Max out at 50 units nightly.  Samples given in office.  -Start Fiasp 20 units with each meal.  Samples given in office.  -Due to patient having symptoms of neuropathy, will start on low-dose Cymbalta.  -Follow-up when A1c due again for medication adjustment.

## 2022-05-09 ENCOUNTER — TELEPHONE (OUTPATIENT)
Dept: PHARMACY | Facility: HOSPITAL | Age: 47
End: 2022-05-09

## 2022-05-09 ENCOUNTER — SPECIALTY PHARMACY (OUTPATIENT)
Dept: PHARMACY | Facility: HOSPITAL | Age: 47
End: 2022-05-09

## 2022-05-09 RX ORDER — PEN NEEDLE, DIABETIC 30 GX3/16"
1 NEEDLE, DISPOSABLE MISCELLANEOUS 4 TIMES DAILY
Qty: 100 EACH | Refills: 5 | Status: SHIPPED | OUTPATIENT
Start: 2022-05-09 | End: 2022-09-12 | Stop reason: SDUPTHER

## 2022-05-09 RX ORDER — INSULIN DEGLUDEC INJECTION 100 U/ML
50 INJECTION, SOLUTION SUBCUTANEOUS NIGHTLY
Qty: 45 ML | Refills: 1 | Status: SHIPPED | OUTPATIENT
Start: 2022-05-09 | End: 2022-09-12 | Stop reason: SDUPTHER

## 2022-05-09 RX ORDER — INSULIN LISPRO 100 [IU]/ML
20 INJECTION, SOLUTION INTRAVENOUS; SUBCUTANEOUS
Qty: 90 ML | Refills: 1 | Status: SHIPPED | OUTPATIENT
Start: 2022-05-09 | End: 2022-05-09

## 2022-05-09 RX ORDER — INSULIN LISPRO 100 [IU]/ML
20 INJECTION, SOLUTION INTRAVENOUS; SUBCUTANEOUS 3 TIMES DAILY
Qty: 15 ML | Refills: 5 | Status: SHIPPED | OUTPATIENT
Start: 2022-05-09 | End: 2022-09-12 | Stop reason: SDUPTHER

## 2022-05-09 NOTE — PROGRESS NOTES
"   Specialty Pharmacy Patient Management Program  Endocrinology Reassessment     Janet Hurtado is a 47 y.o. female with Type 2 Diabetes  enrolled in the Endocrinology Patient Management program offered by [facility].  A follow-up was conducted, including assessment of continued therapy appropriateness, medication adherence, and side effect incidence and management for  Insulin therapy and FreeStyleLibre.     Patient is currently taking Basaglar 50 units at night and Humalog 30 units three times daily with meals. She uses FreeStyle Damon to monitor BG with values in the 300-400s. She reports no low BG <70. Patient reports that BG were doing good with insulin adjustments and have started staying high the past few days, \"as if the medication isn't working anymore\".    Changes to Insurance Coverage or Financial Support  Patient is requesting assistance   Insulin Co-pays: Tresiba $125, Toujeo $115, Levemir $125, Humalog $125. Lantus, novolog, and fiasp are not covered on her insurance   CGM: FreeStyle Damon sensors $54    Relevant Past Medical History and Comorbidities  Relevant medical history and concomitant health conditions were discussed with the patient. The patient's chart has been reviewed for relevant past medical history and comorbid health conditions and updated as necessary.   Past Medical History:   Diagnosis Date   • Depression    • Diabetes mellitus (HCC)    • Fibromyalgia    • Hypertension    • Uterine cancer (HCC)     2008     Social History     Socioeconomic History   • Marital status:    Tobacco Use   • Smoking status: Never Smoker   • Smokeless tobacco: Never Used   Vaping Use   • Vaping Use: Never used   Substance and Sexual Activity   • Alcohol use: No   • Drug use: No   • Sexual activity: Defer            Allergies  Known allergies and reactions were discussed with the patient. The patient's chart has been reviewed for allergy information and updated as necessary. "   Patient has no known allergies.         Relevant Laboratory Values    Lab Results   Component Value Date    HGBA1C 10.20 (H) 09/24/2018     Lab Results   Component Value Date    GLUCOSE 201 (H) 07/21/2021    CALCIUM 9.5 07/21/2021     07/21/2021    K 4.2 07/21/2021    CO2 26.0 07/21/2021     07/21/2021    BUN 10 07/21/2021    CREATININE 0.61 07/21/2021    EGFRIFNONA 106 07/21/2021    BCR 16.4 07/21/2021    ANIONGAP 10.0 07/21/2021     No results found for: CHOL, CHLPL, TRIG, HDL, LDL, LDLDIRECT      Current Medication List  This medication list has been reviewed with the patient and evaluated for any interactions or necessary modifications/recommendations, and updated to include all prescription medications, OTC medications, and supplements the patient is currently taking.  This list reflects what is contained in the patient's profile, which has also been marked as reviewed to communicate to other providers it is the most up to date version of the patient's current medication therapy.     Current Outpatient Medications:   •  Continuous Blood Gluc Sensor (FreeStyle Damon 2 Sensor) misc, 1 each Every 14 (Fourteen) Days., Disp: 6 each, Rfl: 1  •  Insulin Lispro (HumaLOG) 100 UNIT/ML injection, Inject 20 Units under the skin into the appropriate area as directed 3 (Three) Times a Day Before Meals., Disp: 90 mL, Rfl: 1  •  cloNIDine (CATAPRES) 0.1 MG tablet, Take 0.1 mg by mouth 2 (Two) Times a Day As Needed for High Blood Pressure., Disp: , Rfl:   •  DULoxetine (CYMBALTA) 30 MG capsule, Take 1 capsule by mouth Daily., Disp: 30 capsule, Rfl: 1  •  ergocalciferol (ERGOCALCIFEROL) 1.25 MG (57036 UT) capsule, Take 1 capsule by mouth 1 (One) Time Per Week., Disp: , Rfl:   •  ezetimibe (Zetia) 10 MG tablet, Take 1 tablet by mouth Daily., Disp: 30 tablet, Rfl: 1  •  fluticasone (FLONASE) 50 MCG/ACT nasal spray, 1-2 sprays into the nostril(s) as directed by provider As Needed., Disp: , Rfl:   •  hydrOXYzine  (ATARAX) 10 MG tablet, Take 10 mg by mouth 3 (Three) Times a Day As Needed for Itching., Disp: , Rfl:   •  insulin degludec (Tresiba FlexTouch) 100 UNIT/ML solution pen-injector injection, Inject 50 Units under the skin into the appropriate area as directed Every Night. Inject up to 50 units nightly, Disp: 45 mL, Rfl: 1  •  lisinopril (PRINIVIL,ZESTRIL) 20 MG tablet, Take 20 mg by mouth Daily., Disp: , Rfl:   •  meloxicam (MOBIC) 7.5 MG tablet, Take 7.5 mg by mouth Daily As Needed., Disp: , Rfl:   •  methocarbamol (ROBAXIN) 500 MG tablet, Take 500 mg by mouth 4 (Four) Times a Day As Needed for Muscle Spasms., Disp: , Rfl:   •  ondansetron ODT (ZOFRAN-ODT) 4 MG disintegrating tablet, Take 1 tablet by mouth Every 6 (Six) Hours As Needed for nausea or vomiting., Disp: 10 tablet, Rfl: 0         Drug Interactions  None Relevant    Adverse Drug Reactions  • Adverse Reactions Experienced: hyperglycemia  • Plan for ADR Management: pharmacist to consult provider    Hospitalizations and Urgent Care Since Last Assessment  • Hospitalizations or Admissions: None   • ED Visits: None  • Urgent Office Visits: None    Adherence and Self-Administration  Adherence related patient's specialty therapy was discussed with the patient. The Adherence segment of this outreach has been reviewed and updated.     • Ongoing or New Barriers to Patient Adherence and/or Self-Administration: None  • Methods for Supporting Patient Adherence and/or Self-Administration: None Required    Goals of Therapy  Goals related to the patient's specialty therapy was discussed with the patient. The Patient Goals segment of this outreach has been reviewed and updated.    Goals     • Consistently take medications as prescribed     • HEMOGLOBIN A1C < 7            Quality of Life Assessment   Quality of Life related to the patient's specialty therapy was discussed with the patient. The QOL segment of this outreach has been reviewed and updated.      Quality of Life  Assessment  Quality of Life Improvement Scale: Moderately better    Reassessment Plan & Follow-Up  1. Medication Therapy Changes: None - Patient needs assistance with co-pays. She would like to use our pharmacy, will send prescriptions.  2. Additional Plans, Therapy Recommendations, or Therapy Problems to Be Addressed:   · Patient needs adjustment in her insulin to help with all day hyperglycemia   3. Pharmacist to perform regular reassessments no more than (6) months from the previous assessment.  4. Care Coordinator to set up future refill outreaches, coordinate prescription delivery, and escalate clinical questions to pharmacist.     Attestation  I attest that the specialty medication(s) addressed above are appropriate for the patient based on my reassessment.  If the prescribed therapy is at any point deemed not appropriate based on the current or future assessments, a consultation will be initiated with the patient's specialty care provider to determine the best course of action. The revised plan of therapy will be documented along with any additional patient education provided.     Marianela Duncan, PharmD   5/9/2022  10:40 EDT

## 2022-05-10 ENCOUNTER — EDUCATION (OUTPATIENT)
Dept: DIABETES SERVICES | Facility: HOSPITAL | Age: 47
End: 2022-05-10

## 2022-05-10 NOTE — CONSULTS
Diabetes Education    Patient Name:  Janet Hurtado  YOB: 1975  MRN: [id number]  Admit Date:  (Not on file)        Have made several attempts to call patient to educate per consult. Will continue to call.       Electronically signed by:  Luz Gamboa RN  05/10/22 16:39 EDT

## 2022-05-10 NOTE — TELEPHONE ENCOUNTER
She has been having increased BG's recently.  She also has been out of her medication and has not had any short acting insulin for a little while.  She has been using low doses of long acting to compensate.  Instructed her that if her values are running high to confirm with fingerstick.  Also informed for her to get back on her regular regimen of insulin and to notify us in a couple days to determine if she needs a medication adjustment at that time.

## 2022-05-12 ENCOUNTER — EDUCATION (OUTPATIENT)
Dept: DIABETES SERVICES | Facility: HOSPITAL | Age: 47
End: 2022-05-12

## 2022-05-12 NOTE — CONSULTS
Diabetes Education  Assessment/Teaching    Patient Name:  Janet Hurtado  YOB: 1975  MRN: [id number]  Admit Date:  (Not on file)      Assessment Date:  5/12/2022      Flowsheet Row Most Recent Value   DM Education Needs    Meter Has own   Meter Type --  [Paula CGM]   Frequency of Testing Other (comment)  [Paula CGM]   Medication Insulin   Problem Solving Hypoglycemia, Hyperglycemia, Sick days, Signs, Symptoms, Treatment   Reducing Risks Immunizations, Foot care, Dental exam, Eye exam, Blood pressure, Lipids, A1C testing, Cardiovascular, Neuropathy, Retinopathy   Healthy Eating Basic meal plan provided   Physical Activity Walking   Physical Activity Frequency Occasionally, Discussed exercise importance   Healthy Coping Appropriate   Discharge Plan Follow-up with PCP   Motivation Engaged, Strong   Teaching Method Explanation, Discussion, Handouts   Patient Response Verbalized understanding        Patient is a 47-year-old female with a history of diabetes diagnosed about 10 years ago, hypertension, uterine cancer and fibromyalgia. Her recent A1C was 11.9 on 2/22/2022.        Patient received a call and was educated on diet, activity, checking blood glucose, taking medication as prescribed, checking feet daily and S/S of hypo/hyperglycemia. Patient was educated on sick rule days. Patient was mailed a packet of information that includes ADA handouts, AADE 7 handout, fast food book     Healthy eating:  Patient states she will decrease her carb intake and will try to eat healthier. Patient states that she would like menu plans and was shown the American diabetic association web site to set up individualized meal plan.   Being active:   Patient states she will walk at least 30 minutes three times a week and increase the times in two weeks if she can tolerate on good days when her feet are not swollen.   Monitoring:   Patient states that she is taking her blood glucose and it has been high and will not  come down. She states that she has not been as active as she can. Patient has had a Paula CGM about a month 1 an ½ month and has been able to check her blood glucose when she needs to.   Medication:  Patient will take her medications as prescribed. Patient was able to list her medications and the dose as prescribed.  Problem solving:  Patient states the signs and symptoms of hypo/hyperglycemia and the treatment for both.   Reducing risk:  Patient states she checks her feet nightly and does not walk without something to protect her feet. Patient states she goes to her follow up appointments and goes to the eye doctor and dentist when she needs to and on annual visits.   Healthy coping:  Patient has a good support team at home and is able to talk to them for help when she needs it.     Thank you for the consult.         Electronically signed by:  Luz Gamboa RN  05/12/22 17:45 EDT

## 2022-05-25 ENCOUNTER — OFFICE VISIT (OUTPATIENT)
Dept: ENDOCRINOLOGY | Facility: CLINIC | Age: 47
End: 2022-05-25

## 2022-05-25 ENCOUNTER — SPECIALTY PHARMACY (OUTPATIENT)
Dept: PHARMACY | Facility: HOSPITAL | Age: 47
End: 2022-05-25

## 2022-05-25 VITALS
HEART RATE: 66 BPM | DIASTOLIC BLOOD PRESSURE: 82 MMHG | HEIGHT: 65 IN | BODY MASS INDEX: 37.79 KG/M2 | WEIGHT: 226.8 LBS | SYSTOLIC BLOOD PRESSURE: 154 MMHG | OXYGEN SATURATION: 97 %

## 2022-05-25 DIAGNOSIS — Z79.4 TYPE 2 DIABETES MELLITUS WITH HYPERGLYCEMIA, WITH LONG-TERM CURRENT USE OF INSULIN: Primary | ICD-10-CM

## 2022-05-25 DIAGNOSIS — E11.65 TYPE 2 DIABETES MELLITUS WITH HYPERGLYCEMIA, WITH LONG-TERM CURRENT USE OF INSULIN: Primary | ICD-10-CM

## 2022-05-25 LAB
EXPIRATION DATE: NORMAL
GLUCOSE BLDC GLUCOMTR-MCNC: 137 MG/DL (ref 70–130)
HBA1C MFR BLD: 10.3 %
Lab: NORMAL

## 2022-05-25 PROCEDURE — 99213 OFFICE O/P EST LOW 20 MIN: CPT | Performed by: NURSE PRACTITIONER

## 2022-05-25 PROCEDURE — 83036 HEMOGLOBIN GLYCOSYLATED A1C: CPT | Performed by: NURSE PRACTITIONER

## 2022-05-25 PROCEDURE — 82962 GLUCOSE BLOOD TEST: CPT | Performed by: NURSE PRACTITIONER

## 2022-05-25 RX ORDER — SEMAGLUTIDE 1.34 MG/ML
0.25 INJECTION, SOLUTION SUBCUTANEOUS WEEKLY
Qty: 1.5 ML | Refills: 5 | Status: SHIPPED | OUTPATIENT
Start: 2022-05-25 | End: 2022-09-12

## 2022-05-25 NOTE — PROGRESS NOTES
Specialty Pharmacy Patient Management Program  Endocrinology Reassessment     Janet Hurtado is a 47 y.o. female with Type 2 Diabetes seen by an Endocrinology provider and enrolled in the Endocrinology Patient Management program offered by [facility].  A follow-up outreach was conducted, including assessment of continued therapy appropriateness, medication adherence, and side effect incidence and management for Insulin therapy and CGM.    Patient currently takes Humalog 25 units with meals and Tresiba 30 units at night. She uses FreeStyle Damon to monitor BG. She reported 1 low BG where she was shakey. She has been having mostly high BG regardless of what she is eating. She has been cutting out sugar. She is interested in trying a GLP-1.    Changes to Insurance Coverage or Financial Support  None - on patient assistance    Relevant Past Medical History and Comorbidities  Relevant medical history and concomitant health conditions were discussed with the patient. The patient's chart has been reviewed for relevant past medical history and comorbid health conditions and updated as necessary.   Past Medical History:   Diagnosis Date   • Depression    • Diabetes mellitus (HCC)    • Fibromyalgia    • Hypertension    • Uterine cancer (HCC)     2008     Social History     Socioeconomic History   • Marital status:    Tobacco Use   • Smoking status: Never Smoker   • Smokeless tobacco: Never Used   Vaping Use   • Vaping Use: Never used   Substance and Sexual Activity   • Alcohol use: No   • Drug use: No   • Sexual activity: Defer          Allergies  Known allergies and reactions were discussed with the patient. The patient's chart has been reviewed for allergy information and updated as necessary.   Patient has no known allergies.       Relevant Laboratory Values    Lab Results   Component Value Date    HGBA1C 10.20 (H) 09/24/2018     Lab Results   Component Value Date    GLUCOSE 201 (H)  07/21/2021    CALCIUM 9.5 07/21/2021     07/21/2021    K 4.2 07/21/2021    CO2 26.0 07/21/2021     07/21/2021    BUN 10 07/21/2021    CREATININE 0.61 07/21/2021    EGFRIFNONA 106 07/21/2021    BCR 16.4 07/21/2021    ANIONGAP 10.0 07/21/2021     No results found for: CHOL, CHLPL, TRIG, HDL, LDL, LDLDIRECT      Current Medication List  This medication list has been reviewed with the patient and evaluated for any interactions or necessary modifications/recommendations, and updated to include all prescription medications, OTC medications, and supplements the patient is currently taking.  This list reflects what is contained in the patient's profile, which has also been marked as reviewed to communicate to other providers it is the most up to date version of the patient's current medication therapy.     Current Outpatient Medications:   •  cloNIDine (CATAPRES) 0.1 MG tablet, Take 0.1 mg by mouth 2 (Two) Times a Day As Needed for High Blood Pressure., Disp: , Rfl:   •  Continuous Blood Gluc Sensor (FreeStyle Damon 2 Sensor) misc, Use 1 sensor Every 14 (Fourteen) Days., Disp: 6 each, Rfl: 1  •  DULoxetine (CYMBALTA) 30 MG capsule, Take 1 capsule by mouth Daily., Disp: 30 capsule, Rfl: 1  •  ergocalciferol (ERGOCALCIFEROL) 1.25 MG (70058 UT) capsule, Take 1 capsule by mouth 1 (One) Time Per Week., Disp: , Rfl:   •  ezetimibe (Zetia) 10 MG tablet, Take 1 tablet by mouth Daily., Disp: 30 tablet, Rfl: 1  •  fluticasone (FLONASE) 50 MCG/ACT nasal spray, 1-2 sprays into the nostril(s) as directed by provider As Needed., Disp: , Rfl:   •  hydrOXYzine (ATARAX) 10 MG tablet, Take 10 mg by mouth 3 (Three) Times a Day As Needed for Itching., Disp: , Rfl:   •  insulin degludec (Tresiba FlexTouch) 100 UNIT/ML solution pen-injector injection, Inject 50 Units under the skin into the appropriate area as directed Every Night. Inject up to 50 units nightly, Disp: 45 mL, Rfl: 1  •  Insulin Lispro, 1 Unit Dial, (HumaLOG KwikPen)  100 UNIT/ML solution pen-injector, Inject 20 Units under the skin into the appropriate area as directed 3 (Three) Times a Day., Disp: 15 mL, Rfl: 5  •  Insulin Pen Needle (Pen Needles) 32G X 4 MM misc, Use to inject insulin 4 times daily as directed., Disp: 100 each, Rfl: 5  •  lisinopril (PRINIVIL,ZESTRIL) 20 MG tablet, Take 20 mg by mouth Daily., Disp: , Rfl:   •  meloxicam (MOBIC) 7.5 MG tablet, Take 7.5 mg by mouth Daily As Needed., Disp: , Rfl:   •  methocarbamol (ROBAXIN) 500 MG tablet, Take 500 mg by mouth 4 (Four) Times a Day As Needed for Muscle Spasms., Disp: , Rfl:   •  ondansetron ODT (ZOFRAN-ODT) 4 MG disintegrating tablet, Take 1 tablet by mouth Every 6 (Six) Hours As Needed for nausea or vomiting., Disp: 10 tablet, Rfl: 0         Drug Interactions  None Relevant    Adverse Drug Reactions  • Adverse Reactions Experienced: None  • Plan for ADR Management: Not Required    Hospitalizations and Urgent Care Since Last Assessment  • Hospitalizations or Admissions: None  • ED Visits: None  • Urgent Office Visits: None    Adherence and Self-Administration  Adherence related patient's specialty therapy was discussed with the patient. The Adherence segment of this outreach has been reviewed and updated.     • Ongoing or New Barriers to Patient Adherence and/or Self-Administration: None  • Methods for Supporting Patient Adherence and/or Self-Administration: None Requried    Goals of Therapy  Goals related to the patient's specialty therapy was discussed with the patient. The Patient Goals segment of this outreach has been reviewed and updated.    Goals     • Consistently take medications as prescribed     • HEMOGLOBIN A1C < 7            Reassessment Plan & Follow-Up  1. Medication Therapy Changes: Patient will start Ozempic 0.25 mg weekly. Prescriber gave her a sample to try. Requires PA for insurance coverage. If patient is able to tolerate she will let us know and PA will be initiated.   2. Additional Plans,  Therapy Recommendations, or Therapy Problems to Be Addressed: None  3. Pharmacist to perform regular reassessments no more than (6) months from the previous assessment.  4. Care Coordinator to set up future refill outreaches, coordinate prescription delivery, and escalate clinical questions to pharmacist.     Attestation  I attest that the specialty medication(s) addressed above are appropriate for the patient based on my reassessment.  If the prescribed therapy is at any point deemed not appropriate based on the current or future assessments, a consultation will be initiated with the patient's specialty care provider to determine the best course of action. The revised plan of therapy will be documented along with any additional patient education provided.     Marianela Duncan, PharmANTHONY   5/25/2022  16:03 EDT

## 2022-05-25 NOTE — PROGRESS NOTES
Chief Complaint   Patient presents with   • Diabetes        Janet Hurtado is a 47 y.o. female had concerns including Diabetes.      She had some increased BG's.  She stopped all foods except for fruits and vegetables.  She has slowly been increasing her foods to see what she can tolerate.  She has been taking her meds regularly and not missing any doses.  She has noticed improvements in her BG's and symptoms.  She is interested in trying another GLP-1 due to the only symptom she had was some lower abd pain.      Diabetes was diagnosed 10 years ago.  Complications include pain/tingling/burning in her legs, ankles, feet and fingertips, had pressure in her eyes at her most recent eye exam, has seen nephrology and all was well.  Last ophtho exam was Feb 2022, 2020 [facility].  Current medications for diabetes include 30 units Tresiba at night, Humalog 25 units TID with meals.  She has had issues with getting her medication due to increased cost on her co-pays.  We will try to get her assistance with this coverage.  Previous meds: Trulicity-GI Issues, SGLT-2-yeast infections, Metformin-GI Issues, glipizide-stopped when started insulin  She checks her blood sugar up to frequently with [employer] Damon CGM.   Hypos: never    ACE/ARB:yes, Statin: No  Labs:  A1C: 2/22/2022: 11.9   Lipid Panel: Up-to-date  ZANA: Needs done    The following portions of the patient's history were reviewed and updated as appropriate: allergies, current medications, past family history, past medical history, past social history, past surgical history and problem list.    She states that her diet is not great at this moment.  At one point she was on a keto and had her blood sugars on the 100s but since her nephew passed away over a year ago she has let herself go and has not been taking care of her personal medical needs.    Past Medical History:   Diagnosis Date   • Depression    • Diabetes mellitus (HCC)    • Fibromyalgia    • Hypertension    •  Uterine cancer (HCC)          Past Surgical History:   Procedure Laterality Date   • ABDOMINAL SURGERY     • BREAST BIOPSY Left    •  SECTION     • CHOLECYSTECTOMY     • HYSTERECTOMY        Family History   Problem Relation Age of Onset   • No Known Problems Mother    • No Known Problems Father    • No Known Problems Sister    • No Known Problems Brother    • No Known Problems Son    • No Known Problems Daughter    • No Known Problems Maternal Grandmother    • No Known Problems Maternal Grandfather    • No Known Problems Paternal Grandmother    • No Known Problems Paternal Grandfather    • No Known Problems Cousin    • Breast cancer Maternal Aunt    • Rheum arthritis Neg Hx    • Osteoarthritis Neg Hx    • Asthma Neg Hx    • Diabetes Neg Hx    • Heart failure Neg Hx    • Hyperlipidemia Neg Hx    • Hypertension Neg Hx    • Migraines Neg Hx    • Rashes / Skin problems Neg Hx    • Seizures Neg Hx    • Stroke Neg Hx    • Thyroid disease Neg Hx       Social History     Socioeconomic History   • Marital status:    Tobacco Use   • Smoking status: Never Smoker   • Smokeless tobacco: Never Used   Vaping Use   • Vaping Use: Never used   Substance and Sexual Activity   • Alcohol use: No   • Drug use: No   • Sexual activity: Defer      No Known Allergies   Current Outpatient Medications on File Prior to Visit   Medication Sig Dispense Refill   • cloNIDine (CATAPRES) 0.1 MG tablet Take 0.1 mg by mouth 2 (Two) Times a Day As Needed for High Blood Pressure.     • Continuous Blood Gluc Sensor (FreeStyle Damon 2 Sensor) misc Use 1 sensor Every 14 (Fourteen) Days. 6 each 1   • DULoxetine (CYMBALTA) 30 MG capsule Take 1 capsule by mouth Daily. 30 capsule 1   • ergocalciferol (ERGOCALCIFEROL) 1.25 MG (12022 UT) capsule Take 1 capsule by mouth 1 (One) Time Per Week.     • ezetimibe (Zetia) 10 MG tablet Take 1 tablet by mouth Daily. 30 tablet 1   • fluticasone (FLONASE) 50 MCG/ACT nasal spray 1-2 sprays into the  "nostril(s) as directed by provider As Needed.     • hydrOXYzine (ATARAX) 10 MG tablet Take 10 mg by mouth 3 (Three) Times a Day As Needed for Itching.     • insulin degludec (Tresiba FlexTouch) 100 UNIT/ML solution pen-injector injection Inject 50 Units under the skin into the appropriate area as directed Every Night. Inject up to 50 units nightly 45 mL 1   • Insulin Lispro, 1 Unit Dial, (HumaLOG KwikPen) 100 UNIT/ML solution pen-injector Inject 20 Units under the skin into the appropriate area as directed 3 (Three) Times a Day. 15 mL 5   • Insulin Pen Needle (Pen Needles) 32G X 4 MM misc Use to inject insulin 4 times daily as directed. 100 each 5   • lisinopril (PRINIVIL,ZESTRIL) 20 MG tablet Take 20 mg by mouth Daily.     • meloxicam (MOBIC) 7.5 MG tablet Take 7.5 mg by mouth Daily As Needed.     • methocarbamol (ROBAXIN) 500 MG tablet Take 500 mg by mouth 4 (Four) Times a Day As Needed for Muscle Spasms.     • ondansetron ODT (ZOFRAN-ODT) 4 MG disintegrating tablet Take 1 tablet by mouth Every 6 (Six) Hours As Needed for nausea or vomiting. 10 tablet 0     No current facility-administered medications on file prior to visit.        Review of Systems   Constitutional: Positive for activity change, appetite change, fatigue and unexpected weight loss.   Eyes: Positive for blurred vision.   Gastrointestinal: Positive for abdominal pain.   Endocrine: Positive for polydipsia and polyuria.   Neurological: Positive for numbness.   Psychiatric/Behavioral: Positive for sleep disturbance.   All other systems reviewed and are negative.    /82 (BP Location: Left arm, Patient Position: Sitting, Cuff Size: Adult)   Pulse 66   Ht 165.1 cm (65\")   Wt 103 kg (226 lb 12.8 oz)   LMP  (LMP Unknown)   SpO2 97%   BMI 37.74 kg/m²      Physical Exam  Vitals reviewed.   Constitutional:       Appearance: Normal appearance.   Eyes:      Extraocular Movements: Extraocular movements intact.   Cardiovascular:      Rate and Rhythm: " Normal rate.      Pulses: Normal pulses.   Pulmonary:      Effort: Pulmonary effort is normal.   Skin:     General: Skin is warm.   Neurological:      Mental Status: She is alert and oriented to person, place, and time.   Psychiatric:         Mood and Affect: Mood normal.         Behavior: Behavior normal.         Thought Content: Thought content normal.         Judgment: Judgment normal.       CMP:  Lab Results   Component Value Date    BUN 10 07/21/2021    CREATININE 0.61 07/21/2021    EGFRIFNONA 106 07/21/2021    BCR 16.4 07/21/2021     07/21/2021    K 4.2 07/21/2021    CO2 26.0 07/21/2021    CALCIUM 9.5 07/21/2021    ALBUMIN 3.92 07/21/2021    BILITOT 0.6 07/21/2021    ALKPHOS 61 07/21/2021    AST 26 07/21/2021    ALT 28 07/21/2021     Lipid Panel:  No results found for: CHOL, TRIG, HDL, VLDL, LDL  HbA1c:  Lab Results   Component Value Date    HGBA1C 10.3 05/25/2022    HGBA1C 10.20 (H) 09/24/2018     Glucose:  Lab Results   Component Value Date    POCGLU 137 (A) 05/25/2022     Microalbumin:  No results found for: MALBCRERATIO  TSH:  Lab Results   Component Value Date    TSH 0.928 07/21/2021       Assessment and Plan    Diagnoses and all orders for this visit:    1. Type 2 diabetes mellitus with hyperglycemia, with long-term current use of insulin (HCC) (Primary)  Assessment & Plan:  -Diabetes is improving with A1C down from 11.9 to 10.3.  -Discussed in length with the patient the dietary and exercise guidelines.    -Discussed importance of yearly eye exams.  She will keep the scheduled appointments.  -Continue using Freestyle Damon.  -Discussed importance of notifying office if her insulin is running low or out of date.  Discussed the importance of taking her medication regularly and not missing doses to keep her blood sugars under control.  -Continue Tresiba 30 units qhs.  -Continue Humalog 25 units TID with meals.  -Continue Cymbalta.    -Trial of Ozempic 0.25 mg weekly.  Patient has no personal history  of pancreatitis, no family history of MEN syndrome or medullary thyroid cancer. Possible side effects including nausea, bloating, other GI upset and rarely pancreatitis were discussed. She was advised to call the office with any symptoms or concerns.   -S/S hypoglycemia reviewed with Rule of 15's advised.  -Follow-up in 3 months.    Orders:  -     POC Glucose Fingerstick  -     POC Glycosylated Hemoglobin (Hb A1C)       Return in about 3 months (around 8/25/2022) for Follow-up appointment, A1C. The patient was instructed to contact the clinic with any interval questions or concerns.    This document has been electronically signed by HAVEN Montelongo  May 25, 2022 16:15 EDT  Endocrinology

## 2022-05-25 NOTE — ASSESSMENT & PLAN NOTE
-Diabetes is improving with A1C down from 11.9 to 10.3.  -Discussed in length with the patient the dietary and exercise guidelines.    -Discussed importance of yearly eye exams.  She will keep the scheduled appointments.  -Continue using Freestyle Damon.  -Discussed importance of notifying office if her insulin is running low or out of date.  Discussed the importance of taking her medication regularly and not missing doses to keep her blood sugars under control.  -Continue Tresiba 30 units qhs.  -Continue Humalog 25 units TID with meals.  -Continue Cymbalta.    -Trial of Ozempic 0.25 mg weekly.  Patient has no personal history of pancreatitis, no family history of MEN syndrome or medullary thyroid cancer. Possible side effects including nausea, bloating, other GI upset and rarely pancreatitis were discussed. She was advised to call the office with any symptoms or concerns.   -S/S hypoglycemia reviewed with Rule of 15's advised.  -Follow-up in 3 months.

## 2022-06-01 ENCOUNTER — SPECIALTY PHARMACY (OUTPATIENT)
Dept: PHARMACY | Facility: HOSPITAL | Age: 47
End: 2022-06-01

## 2022-07-01 ENCOUNTER — SPECIALTY PHARMACY (OUTPATIENT)
Dept: PHARMACY | Facility: HOSPITAL | Age: 47
End: 2022-07-01

## 2022-07-01 NOTE — PROGRESS NOTES
Patient reported side effects to Ozempic (sulfur burps, nausea, vomiting, diarrhea). Prescriber instructed her to stop taking the Ozempic and monitor BG more carefully.

## 2022-07-13 ENCOUNTER — TELEPHONE (OUTPATIENT)
Dept: ENDOCRINOLOGY | Facility: CLINIC | Age: 47
End: 2022-07-13

## 2022-07-13 NOTE — TELEPHONE ENCOUNTER
Let her know that since she has had side effects to the other medications, the only medication that we can use for her at this time is insulin.  See what her fasting values and what her meal-time values are running.  Let me know and I will guide on insulin adjustments.

## 2022-07-13 NOTE — TELEPHONE ENCOUNTER
Pt called to update us that since she has been off Ozempic her sugars have been running higher than they were.

## 2022-07-14 NOTE — TELEPHONE ENCOUNTER
Increase Tresiba to 35 units QHS and Humalog 35 units TID with meals.  Call back in 2 weeks with values for further medication adjustment.

## 2022-07-14 NOTE — TELEPHONE ENCOUNTER
"Per pt:  Fasting values - average 159  Meal Time - over 200 (ex: 237,259)  \"Out of target range\" went from >5% to 34% since being off Ozempic. "

## 2022-07-19 ENCOUNTER — SPECIALTY PHARMACY (OUTPATIENT)
Dept: PHARMACY | Facility: HOSPITAL | Age: 47
End: 2022-07-19

## 2022-07-19 RX ORDER — GABAPENTIN 100 MG/1
200 CAPSULE ORAL NIGHTLY
COMMUNITY
End: 2022-12-12

## 2022-07-19 NOTE — PROGRESS NOTES
Ozempic requires a PA. Will submit today and mail to patient once approved.     Patient started on Gabapentin 200 mg at night. Reviewed medicine change. Not an issues for diabetes management.

## 2022-08-22 ENCOUNTER — SPECIALTY PHARMACY (OUTPATIENT)
Dept: PHARMACY | Facility: HOSPITAL | Age: 47
End: 2022-08-22

## 2022-08-29 RX ORDER — ROSUVASTATIN CALCIUM 5 MG/1
5 TABLET, COATED ORAL NIGHTLY
Qty: 30 TABLET | Refills: 2 | Status: SHIPPED | OUTPATIENT
Start: 2022-08-29 | End: 2022-12-12

## 2022-09-12 ENCOUNTER — SPECIALTY PHARMACY (OUTPATIENT)
Dept: PHARMACY | Facility: HOSPITAL | Age: 47
End: 2022-09-12

## 2022-09-12 ENCOUNTER — OFFICE VISIT (OUTPATIENT)
Dept: ENDOCRINOLOGY | Facility: CLINIC | Age: 47
End: 2022-09-12

## 2022-09-12 VITALS
BODY MASS INDEX: 37.59 KG/M2 | HEIGHT: 65 IN | WEIGHT: 225.6 LBS | SYSTOLIC BLOOD PRESSURE: 164 MMHG | HEART RATE: 75 BPM | OXYGEN SATURATION: 98 % | DIASTOLIC BLOOD PRESSURE: 100 MMHG

## 2022-09-12 DIAGNOSIS — E11.65 TYPE 2 DIABETES MELLITUS WITH HYPERGLYCEMIA, WITH LONG-TERM CURRENT USE OF INSULIN: Primary | ICD-10-CM

## 2022-09-12 DIAGNOSIS — Z79.4 TYPE 2 DIABETES MELLITUS WITH HYPERGLYCEMIA, WITH LONG-TERM CURRENT USE OF INSULIN: Primary | ICD-10-CM

## 2022-09-12 LAB — GLUCOSE BLDC GLUCOMTR-MCNC: 170 MG/DL (ref 70–130)

## 2022-09-12 PROCEDURE — 99213 OFFICE O/P EST LOW 20 MIN: CPT | Performed by: NURSE PRACTITIONER

## 2022-09-12 PROCEDURE — 82962 GLUCOSE BLOOD TEST: CPT | Performed by: NURSE PRACTITIONER

## 2022-09-12 RX ORDER — PEN NEEDLE, DIABETIC 30 GX3/16"
1 NEEDLE, DISPOSABLE MISCELLANEOUS 4 TIMES DAILY
Qty: 100 EACH | Refills: 5 | Status: SHIPPED | OUTPATIENT
Start: 2022-09-12 | End: 2022-12-12

## 2022-09-12 RX ORDER — INSULIN LISPRO 100 [IU]/ML
35 INJECTION, SOLUTION INTRAVENOUS; SUBCUTANEOUS
Qty: 30 ML | Refills: 5 | Status: SHIPPED | OUTPATIENT
Start: 2022-09-12 | End: 2022-12-12 | Stop reason: SDUPTHER

## 2022-09-12 RX ORDER — SEMAGLUTIDE 1.34 MG/ML
1 INJECTION, SOLUTION SUBCUTANEOUS WEEKLY
Qty: 3 ML | Refills: 5 | Status: SHIPPED | OUTPATIENT
Start: 2022-09-12 | End: 2022-12-12

## 2022-09-12 RX ORDER — INSULIN DEGLUDEC INJECTION 100 U/ML
50 INJECTION, SOLUTION SUBCUTANEOUS NIGHTLY
Qty: 15 ML | Refills: 5 | Status: SHIPPED | OUTPATIENT
Start: 2022-09-12 | End: 2022-12-12 | Stop reason: SDUPTHER

## 2022-09-12 RX ORDER — GABAPENTIN 300 MG/1
600 CAPSULE ORAL NIGHTLY
COMMUNITY

## 2022-09-12 NOTE — PROGRESS NOTES
Specialty Pharmacy Patient Management Program  Endocrinology Reassessment     Janet Hurtado is a 47 y.o. female with Type 2 Diabetes  enrolled in the Endocrinology Patient Management program offered by [facility].  A follow-up was conducted, including assessment of continued therapy appropriateness, medication adherence, and side effect incidence and management for Insulin therapy, Ozempic, and FreeStyleLibre.     Patient is currently taking Ozempic 0.5 mg weekly, Treiba 35 units at night and Humalog 35 units three times daily with meals. She uses FreeStyle Damon to monitor BG with values in the upper 100s, sometimes goes into 200 or 300s. She reports having a few low BG <70 and is able to correct. She was having side effects from the Ozempic and stopped it, then her BG increased so she restarted. After she restarted, she was not having any side effects and is currently tolerating the 0.5 mg.     Changes to Insurance Coverage or Financial Support  Patient is on assistance program    Relevant Past Medical History and Comorbidities  Relevant medical history and concomitant health conditions were discussed with the patient. The patient's chart has been reviewed for relevant past medical history and comorbid health conditions and updated as necessary.   Past Medical History:   Diagnosis Date   • Depression    • Diabetes mellitus (HCC)    • Fibromyalgia    • Hypertension    • Uterine cancer (HCC)     2008     Social History     Socioeconomic History   • Marital status:    Tobacco Use   • Smoking status: Never Smoker   • Smokeless tobacco: Never Used   Vaping Use   • Vaping Use: Never used   Substance and Sexual Activity   • Alcohol use: No   • Drug use: No   • Sexual activity: Defer       Problem list reviewed by Cleo Youisf, Pharmacy Intern on 9/12/2022 at  3:24 PM    Allergies  Known allergies and reactions were discussed with the patient. The patient's chart has been reviewed  for allergy information and updated as necessary.   Patient has no known allergies.    Allergies reviewed by Cleo Yousif, Pharmacy Intern on 9/12/2022 at  3:16 PM    Relevant Laboratory Values  A1C Last 3 Results    HGBA1C Last 3 Results 5/25/22   Hemoglobin A1C 10.3           Lab Results   Component Value Date    HGBA1C 10.3 05/25/2022     Lab Results   Component Value Date    GLUCOSE 201 (H) 07/21/2021    CALCIUM 9.5 07/21/2021     07/21/2021    K 4.2 07/21/2021    CO2 26.0 07/21/2021     07/21/2021    BUN 10 07/21/2021    CREATININE 0.61 07/21/2021    EGFRIFNONA 106 07/21/2021    BCR 16.4 07/21/2021    ANIONGAP 10.0 07/21/2021     No results found for: CHOL, CHLPL, TRIG, HDL, LDL, LDLDIRECT      Current Medication List  This medication list has been reviewed with the patient and evaluated for any interactions or necessary modifications/recommendations, and updated to include all prescription medications, OTC medications, and supplements the patient is currently taking.  This list reflects what is contained in the patient's profile, which has also been marked as reviewed to communicate to other providers it is the most up to date version of the patient's current medication therapy.     Current Outpatient Medications:   •  cloNIDine (CATAPRES) 0.1 MG tablet, Take 0.1 mg by mouth 2 (Two) Times a Day As Needed for High Blood Pressure., Disp: , Rfl:   •  Continuous Blood Gluc Sensor (FreeStyle Damon 2 Sensor) misc, Use 1 sensor Every 14 (Fourteen) Days., Disp: 6 each, Rfl: 1  •  ergocalciferol (ERGOCALCIFEROL) 1.25 MG (91658 UT) capsule, Take 1 capsule by mouth 1 (One) Time Per Week., Disp: , Rfl:   •  fluticasone (FLONASE) 50 MCG/ACT nasal spray, 1-2 sprays into the nostril(s) as directed by provider As Needed., Disp: , Rfl:   •  gabapentin (NEURONTIN) 300 MG capsule, Take 600 mg by mouth Every Night., Disp: , Rfl:   •  hydrOXYzine (ATARAX) 10 MG tablet, Take 10 mg by mouth 3 (Three) Times a Day As  Needed for Anxiety., Disp: , Rfl:   •  insulin degludec (Tresiba FlexTouch) 100 UNIT/ML solution pen-injector injection, Inject 50 Units under the skin into the appropriate area as directed Every Night. Inject up to 50 units nightly (Patient taking differently: Inject 35 Units under the skin into the appropriate area as directed Every Night.), Disp: 45 mL, Rfl: 1  •  Insulin Lispro, 1 Unit Dial, (HumaLOG KwikPen) 100 UNIT/ML solution pen-injector, Inject 20 Units under the skin into the appropriate area as directed 3 (Three) Times a Day. (Patient taking differently: Inject 35 Units under the skin into the appropriate area as directed 3 (Three) Times a Day.), Disp: 15 mL, Rfl: 5  •  Insulin Pen Needle (Pen Needles) 32G X 4 MM misc, Use to inject insulin 4 times daily as directed., Disp: 100 each, Rfl: 5  •  lisinopril (PRINIVIL,ZESTRIL) 20 MG tablet, Take 20 mg by mouth Daily., Disp: , Rfl:   •  meloxicam (MOBIC) 7.5 MG tablet, Take 7.5 mg by mouth Daily As Needed., Disp: , Rfl:   •  methocarbamol (ROBAXIN) 500 MG tablet, Take 500 mg by mouth 4 (Four) Times a Day As Needed for Muscle Spasms., Disp: , Rfl:   •  ondansetron ODT (ZOFRAN-ODT) 4 MG disintegrating tablet, Take 1 tablet by mouth Every 6 (Six) Hours As Needed for nausea or vomiting., Disp: 10 tablet, Rfl: 0  •  rosuvastatin (Crestor) 5 MG tablet, Take 1 tablet by mouth Every Night., Disp: 30 tablet, Rfl: 2  •  Semaglutide,0.25 or 0.5MG/DOS, (Ozempic, 0.25 or 0.5 MG/DOSE,) 2 MG/1.5ML solution pen-injector, Inject 0.25 mg under the skin into the appropriate area as directed 1 (One) Time Per Week. (Patient taking differently: Inject 0.5 mg under the skin into the appropriate area as directed 1 (One) Time Per Week.), Disp: 1.5 mL, Rfl: 5  •  DULoxetine (CYMBALTA) 30 MG capsule, Take 1 capsule by mouth Daily., Disp: 30 capsule, Rfl: 1  •  ezetimibe (Zetia) 10 MG tablet, Take 1 tablet by mouth Daily., Disp: 30 tablet, Rfl: 1  •  gabapentin (NEURONTIN) 100 MG  capsule, Take 200 mg by mouth Every Night., Disp: , Rfl:     Medicines reviewed by Cleo Yousif, Pharmacy Intern on 9/12/2022 at  3:17 PM  Medicines reviewed by Cleo Yousif, Pharmacy Intern on 9/12/2022 at  3:23 PM    Drug Interactions  None Relevant    Adverse Drug Reactions  • Adverse Reactions Experienced: hyperglycemia  • Plan for ADR Management: pharmacist to consult provider    Hospitalizations and Urgent Care Since Last Assessment  • Hospitalizations or Admissions: None   • ED Visits: None  • Urgent Office Visits: None    Adherence and Self-Administration  Adherence related patient's specialty therapy was discussed with the patient. The Adherence segment of this outreach has been reviewed and updated.     • Ongoing or New Barriers to Patient Adherence and/or Self-Administration: None  • Methods for Supporting Patient Adherence and/or Self-Administration: None Required    Goals of Therapy  Goals related to the patient's specialty therapy was discussed with the patient. The Patient Goals segment of this outreach has been reviewed and updated.    Goals     • Consistently take medications as prescribed     • HEMOGLOBIN A1C < 7            Quality of Life Assessment   Quality of Life related to the patient's specialty therapy was discussed with the patient. The QOL segment of this outreach has been reviewed and updated.        Reassessment Plan & Follow-Up  1. Medication Therapy Changes: None discussed with patient at this visit.  2. Additional Plans, Therapy Recommendations, or Therapy Problems to Be Addressed:   · Diabetes has improved with an A1c down from over 10% to 7.3%.  · Patient has been tolerating Ozempic well with no side effects. BG levels are varying widely. Patient could benefit from an increase in Ozempic to 1 mg.  · Patient says she has experienced few lows. Would recommend glucose tablets to keep on hand to correct lows.  3. Pharmacist to perform regular reassessments no more than (6) months  from the previous assessment.  4. Care Coordinator to set up future refill outreaches, coordinate prescription delivery, and escalate clinical questions to pharmacist.     Attestation  I attest that the specialty medication(s) addressed above are appropriate for the patient based on my reassessment.  If the prescribed therapy is at any point deemed not appropriate based on the current or future assessments, a consultation will be initiated with the patient's specialty care provider to determine the best course of action. The revised plan of therapy will be documented along with any additional patient education provided.     Cleo Yousif, Pharmacy Intern  Marianela Duncan PharmD   9/12/2022  16:17 EDT

## 2022-09-12 NOTE — ASSESSMENT & PLAN NOTE
-Diabetes is improving with A1C down from 10.3 to 7's and noted improving BG's.  -Discussed in length with the patient the dietary and exercise guidelines.    -Discussed importance of yearly eye exams.  She will keep the scheduled appointments.  -Discussed importance of checking BG's. Continue using Freestyle Damon.  -Discussed importance of notifying office if her insulin is running low or out of date.  Discussed the importance of taking her medication regularly and not missing doses to keep her blood sugars under control.  -Continue Tresiba 35 units qhs.  -Continue Humalog 35 units TID with meals.  -Increase Ozempic 1 mg weekly.  Patient has no personal history of pancreatitis, no family history of MEN syndrome or medullary thyroid cancer. Possible side effects including nausea, bloating, other GI upset and rarely pancreatitis were discussed. She was advised to call the office with any symptoms or concerns.   -S/S hypoglycemia reviewed with Rule of 15's advised.  -Follow-up in 3 months

## 2022-09-12 NOTE — PROGRESS NOTES
Chief Complaint   Patient presents with   • Diabetes     F/u. Very lethargic        Janet Hurtado is a 47 y.o. female had concerns including Diabetes (F/u. Very lethargic).      Diabetes was diagnosed 10 years ago.  Complications include pain/tingling/burning in her legs, ankles, feet and fingertips, had pressure in her eyes at her most recent eye exam, has seen nephrology and all was well.  Last ophtho exam was  [facility].  Current medications for diabetes include 35 units Tresiba at night, Humalog 35 units TID with meals, Ozempic 0.5 mg weekly.  She has had issues with getting her medication due to increased cost on her co-pays.  We will try to get her assistance with this coverage.  Previous meds: Trulicity-GI Issues, SGLT-2-yeast infections, Metformin-GI Issues, glipizide-stopped when started insulin  She checks her blood sugar up to frequently with Lendio Damon CGM.   Hypos: never    ACE/ARB:yes, Statin: No  Labs:  A1C: 2022: 11.9   Lipid Panel: Up-to-date  ZANA: Needs done    She recently had an A1c done at PCP that was in the 7's.     The following portions of the patient's history were reviewed and updated as appropriate: allergies, current medications, past family history, past medical history, past social history, past surgical history and problem list.    She states that her diet is not great at this moment.  At one point she was on a keto and had her blood sugars on the 100s but since her nephew passed away over a year ago she has let herself go and has not been taking care of her personal medical needs.    Past Medical History:   Diagnosis Date   • Depression    • Diabetes mellitus (HCC)    • Fibromyalgia    • Hypertension    • Uterine cancer (HCC)          Past Surgical History:   Procedure Laterality Date   • ABDOMINAL SURGERY     • BREAST BIOPSY Left    •  SECTION     • CHOLECYSTECTOMY     • HYSTERECTOMY        Family History   Problem Relation Age of Onset    • No Known Problems Mother    • No Known Problems Father    • No Known Problems Sister    • No Known Problems Brother    • No Known Problems Son    • No Known Problems Daughter    • No Known Problems Maternal Grandmother    • No Known Problems Maternal Grandfather    • No Known Problems Paternal Grandmother    • No Known Problems Paternal Grandfather    • No Known Problems Cousin    • Breast cancer Maternal Aunt    • Rheum arthritis Neg Hx    • Osteoarthritis Neg Hx    • Asthma Neg Hx    • Diabetes Neg Hx    • Heart failure Neg Hx    • Hyperlipidemia Neg Hx    • Hypertension Neg Hx    • Migraines Neg Hx    • Rashes / Skin problems Neg Hx    • Seizures Neg Hx    • Stroke Neg Hx    • Thyroid disease Neg Hx       Social History     Socioeconomic History   • Marital status:    Tobacco Use   • Smoking status: Never Smoker   • Smokeless tobacco: Never Used   Vaping Use   • Vaping Use: Never used   Substance and Sexual Activity   • Alcohol use: No   • Drug use: No   • Sexual activity: Defer      No Known Allergies   Current Outpatient Medications on File Prior to Visit   Medication Sig Dispense Refill   • cloNIDine (CATAPRES) 0.1 MG tablet Take 0.1 mg by mouth 2 (Two) Times a Day As Needed for High Blood Pressure.     • Continuous Blood Gluc Sensor (FreeStyle Damon 2 Sensor) misc Use 1 sensor Every 14 (Fourteen) Days. 6 each 1   • DULoxetine (CYMBALTA) 30 MG capsule Take 1 capsule by mouth Daily. 30 capsule 1   • ergocalciferol (ERGOCALCIFEROL) 1.25 MG (34906 UT) capsule Take 1 capsule by mouth 1 (One) Time Per Week.     • ezetimibe (Zetia) 10 MG tablet Take 1 tablet by mouth Daily. 30 tablet 1   • fluticasone (FLONASE) 50 MCG/ACT nasal spray 1-2 sprays into the nostril(s) as directed by provider As Needed.     • gabapentin (NEURONTIN) 100 MG capsule Take 200 mg by mouth Every Night.     • hydrOXYzine (ATARAX) 10 MG tablet Take 10 mg by mouth 3 (Three) Times a Day As Needed for Anxiety.     • insulin degludec  (Tresiba FlexTouch) 100 UNIT/ML solution pen-injector injection Inject 50 Units under the skin into the appropriate area as directed Every Night. Inject up to 50 units nightly (Patient taking differently: Inject 35 Units under the skin into the appropriate area as directed Every Night.) 45 mL 1   • Insulin Lispro, 1 Unit Dial, (HumaLOG KwikPen) 100 UNIT/ML solution pen-injector Inject 20 Units under the skin into the appropriate area as directed 3 (Three) Times a Day. (Patient taking differently: Inject 35 Units under the skin into the appropriate area as directed 3 (Three) Times a Day.) 15 mL 5   • Insulin Pen Needle (Pen Needles) 32G X 4 MM misc Use to inject insulin 4 times daily as directed. 100 each 5   • lisinopril (PRINIVIL,ZESTRIL) 20 MG tablet Take 20 mg by mouth Daily.     • meloxicam (MOBIC) 7.5 MG tablet Take 7.5 mg by mouth Daily As Needed.     • methocarbamol (ROBAXIN) 500 MG tablet Take 500 mg by mouth 4 (Four) Times a Day As Needed for Muscle Spasms.     • ondansetron ODT (ZOFRAN-ODT) 4 MG disintegrating tablet Take 1 tablet by mouth Every 6 (Six) Hours As Needed for nausea or vomiting. 10 tablet 0   • rosuvastatin (Crestor) 5 MG tablet Take 1 tablet by mouth Every Night. 30 tablet 2   • Semaglutide,0.25 or 0.5MG/DOS, (Ozempic, 0.25 or 0.5 MG/DOSE,) 2 MG/1.5ML solution pen-injector Inject 0.25 mg under the skin into the appropriate area as directed 1 (One) Time Per Week. (Patient taking differently: Inject 0.5 mg under the skin into the appropriate area as directed 1 (One) Time Per Week.) 1.5 mL 5     No current facility-administered medications on file prior to visit.        Review of Systems   Constitutional: Positive for activity change, appetite change, fatigue and unexpected weight loss.   Eyes: Positive for blurred vision.   Gastrointestinal: Positive for abdominal pain.   Endocrine: Positive for polydipsia and polyuria.   Neurological: Positive for numbness.   Psychiatric/Behavioral: Positive  "for sleep disturbance.   All other systems reviewed and are negative.    /100 (BP Location: Right arm, Patient Position: Sitting, Cuff Size: Adult)   Pulse 75   Ht 165.1 cm (65\")   Wt 102 kg (225 lb 9.6 oz)   LMP  (LMP Unknown)   SpO2 98%   BMI 37.54 kg/m²      Physical Exam  Vitals reviewed.   Constitutional:       Appearance: Normal appearance.   Eyes:      Extraocular Movements: Extraocular movements intact.   Cardiovascular:      Rate and Rhythm: Normal rate.      Pulses: Normal pulses.   Pulmonary:      Effort: Pulmonary effort is normal.   Skin:     General: Skin is warm.   Neurological:      Mental Status: She is alert and oriented to person, place, and time.   Psychiatric:         Mood and Affect: Mood normal.         Behavior: Behavior normal.         Thought Content: Thought content normal.         Judgment: Judgment normal.       CMP:  Lab Results   Component Value Date    BUN 10 07/21/2021    CREATININE 0.61 07/21/2021    EGFRIFNONA 106 07/21/2021    BCR 16.4 07/21/2021     07/21/2021    K 4.2 07/21/2021    CO2 26.0 07/21/2021    CALCIUM 9.5 07/21/2021    ALBUMIN 3.92 07/21/2021    BILITOT 0.6 07/21/2021    ALKPHOS 61 07/21/2021    AST 26 07/21/2021    ALT 28 07/21/2021     Lipid Panel:  No results found for: CHOL, TRIG, HDL, VLDL, LDL  HbA1c:  Lab Results   Component Value Date    HGBA1C 10.3 05/25/2022    HGBA1C 10.20 (H) 09/24/2018     Glucose:  Lab Results   Component Value Date    POCGLU 170 (A) 09/12/2022     Microalbumin:  No results found for: MALBCRERATIO  TSH:  Lab Results   Component Value Date    TSH 0.928 07/21/2021       Assessment and Plan    Diagnoses and all orders for this visit:    1. Type 2 diabetes mellitus with hyperglycemia, with long-term current use of insulin (HCC) (Primary)  Assessment & Plan:  -Diabetes is improving with A1C down from 10.3 to 7's and noted improving BG's.  -Discussed in length with the patient the dietary and exercise guidelines. "    -Discussed importance of yearly eye exams.  She will keep the scheduled appointments.  -Discussed importance of checking BG's. Continue using Freestyle Damon.  -Discussed importance of notifying office if her insulin is running low or out of date.  Discussed the importance of taking her medication regularly and not missing doses to keep her blood sugars under control.  -Continue Tresiba 35 units qhs.  -Continue Humalog 35 units TID with meals.  -Increase Ozempic 1 mg weekly.  Patient has no personal history of pancreatitis, no family history of MEN syndrome or medullary thyroid cancer. Possible side effects including nausea, bloating, other GI upset and rarely pancreatitis were discussed.  She was advised to call the office with any symptoms or concerns.   -S/S hypoglycemia reviewed with Rule of 15's advised.  -Follow-up in 3 months    Orders:  -     POC Glucose Fingerstick       Return in about 3 months (around 12/12/2022) for Follow-up appointment, A1C. The patient was instructed to contact the clinic with any interval questions or concerns.    This document has been electronically signed by HAVEN Montelongo  September 12, 2022 16:31 EDT  Endocrinology

## 2022-10-10 ENCOUNTER — SPECIALTY PHARMACY (OUTPATIENT)
Dept: PHARMACY | Facility: HOSPITAL | Age: 47
End: 2022-10-10

## 2022-10-26 ENCOUNTER — TRANSCRIBE ORDERS (OUTPATIENT)
Dept: ADMINISTRATIVE | Facility: HOSPITAL | Age: 47
End: 2022-10-26

## 2022-10-26 DIAGNOSIS — M54.42 ACUTE BACK PAIN WITH SCIATICA, LEFT: Primary | ICD-10-CM

## 2022-11-09 ENCOUNTER — SPECIALTY PHARMACY (OUTPATIENT)
Dept: PHARMACY | Facility: HOSPITAL | Age: 47
End: 2022-11-09

## 2022-11-17 ENCOUNTER — APPOINTMENT (OUTPATIENT)
Dept: MRI IMAGING | Facility: HOSPITAL | Age: 47
End: 2022-11-17

## 2022-12-12 ENCOUNTER — SPECIALTY PHARMACY (OUTPATIENT)
Dept: PHARMACY | Facility: HOSPITAL | Age: 47
End: 2022-12-12

## 2022-12-12 ENCOUNTER — OFFICE VISIT (OUTPATIENT)
Dept: ENDOCRINOLOGY | Facility: CLINIC | Age: 47
End: 2022-12-12

## 2022-12-12 VITALS
SYSTOLIC BLOOD PRESSURE: 136 MMHG | OXYGEN SATURATION: 97 % | HEIGHT: 65 IN | DIASTOLIC BLOOD PRESSURE: 68 MMHG | HEART RATE: 58 BPM | BODY MASS INDEX: 37.25 KG/M2 | WEIGHT: 223.6 LBS

## 2022-12-12 DIAGNOSIS — Z79.4 TYPE 2 DIABETES MELLITUS WITH HYPERGLYCEMIA, WITH LONG-TERM CURRENT USE OF INSULIN: Primary | ICD-10-CM

## 2022-12-12 DIAGNOSIS — E11.65 TYPE 2 DIABETES MELLITUS WITH HYPERGLYCEMIA, WITH LONG-TERM CURRENT USE OF INSULIN: Primary | ICD-10-CM

## 2022-12-12 LAB — GLUCOSE BLDC GLUCOMTR-MCNC: 158 MG/DL (ref 70–130)

## 2022-12-12 PROCEDURE — 99214 OFFICE O/P EST MOD 30 MIN: CPT | Performed by: NURSE PRACTITIONER

## 2022-12-12 PROCEDURE — 82962 GLUCOSE BLOOD TEST: CPT | Performed by: NURSE PRACTITIONER

## 2022-12-12 RX ORDER — INSULIN DEGLUDEC INJECTION 100 U/ML
50 INJECTION, SOLUTION SUBCUTANEOUS NIGHTLY
Qty: 15 ML | Refills: 5 | Status: SHIPPED | OUTPATIENT
Start: 2022-12-12 | End: 2023-03-23 | Stop reason: SDUPTHER

## 2022-12-12 RX ORDER — SEMAGLUTIDE 1.34 MG/ML
0.25 INJECTION, SOLUTION SUBCUTANEOUS WEEKLY
Qty: 1.5 ML | Refills: 3 | Status: SHIPPED | OUTPATIENT
Start: 2022-12-12 | End: 2023-03-23 | Stop reason: SDUPTHER

## 2022-12-12 RX ORDER — INSULIN LISPRO 100 [IU]/ML
35 INJECTION, SOLUTION INTRAVENOUS; SUBCUTANEOUS
Qty: 30 ML | Refills: 5 | Status: SHIPPED | OUTPATIENT
Start: 2022-12-12 | End: 2023-03-23 | Stop reason: SDUPTHER

## 2022-12-12 NOTE — ASSESSMENT & PLAN NOTE
-Diabetes is improving with A1C down to 6.8.  -Discussed in length with the patient the dietary and exercise guidelines.    -Discussed importance of yearly eye exams.  She will keep the scheduled appointments.  -Discussed importance of checking BG's. Continue using Freestyle Damon.  -Discussed importance of notifying office if her insulin is running low or out of date.  Discussed the importance of taking her medication regularly and not missing doses to keep her blood sugars under control.  -Continue Tresiba 25 units qhs.  -Continue Humalog 5-10 units TID with meals based on carbs consuming.  -Start Ozempic 0.25 mg weekly.  Patient has no personal history of pancreatitis, no family history of MEN syndrome or medullary thyroid cancer. Possible side effects including nausea, bloating, other GI upset and rarely pancreatitis were discussed. She was advised to call the office with any symptoms or concerns.   -S/S hypoglycemia reviewed with Rule of 15's advised.  -Follow-up in 3 months

## 2022-12-12 NOTE — PROGRESS NOTES
Specialty Pharmacy Patient Management Program  Endocrinology Reassessment     Janet Hurtado is a 47 y.o. female with Type 2 Diabetes  enrolled in the Endocrinology Patient Management program offered by [facility].  A follow-up was conducted, including assessment of continued therapy appropriateness, medication adherence, and side effect incidence and management for Insulin therapy, Ozempic, and FreeStyleLibre.     Patient is not currently taking any medications at this time. She has been sick with the flu for the past month and has not been eating. She has slowly started to re-introduce foods and has taken a few doses of Humalog 30-35 units to compensate for meals. Before she got sick, she was taking Ozempic 1 mg weekly, Tresiba 35 units at night and Humalog 35 units three times daily with meals. She uses FreeStyle Damon to monitor BG with values in the 200 since she has not been taking her medications. She reports having a few, random low BG <70.     Changes to Insurance Coverage or Financial Support  Patient is on assistance program    Relevant Past Medical History and Comorbidities  Relevant medical history and concomitant health conditions were discussed with the patient. The patient's chart has been reviewed for relevant past medical history and comorbid health conditions and updated as necessary.   Past Medical History:   Diagnosis Date   • Depression    • Diabetes mellitus (HCC)    • Fibromyalgia    • Hypertension    • Uterine cancer (HCC)     2008     Social History     Socioeconomic History   • Marital status:    Tobacco Use   • Smoking status: Never   • Smokeless tobacco: Never   Vaping Use   • Vaping Use: Never used   Substance and Sexual Activity   • Alcohol use: No   • Drug use: No   • Sexual activity: Defer       Problem list reviewed by Marianela Duncan RPH on 12/12/2022 at  8:50 AM    Allergies  Known allergies and reactions were discussed with the patient. The  patient's chart has been reviewed for allergy information and updated as necessary.   Patient has no known allergies.    Allergies reviewed by Marianela Duncan RPH on 12/12/2022 at  8:45 AM    Relevant Laboratory Values  A1C Last 3 Results    HGBA1C Last 3 Results 5/25/22   Hemoglobin A1C 10.3           Lab Results   Component Value Date    HGBA1C 10.3 05/25/2022     Lab Results   Component Value Date    GLUCOSE 201 (H) 07/21/2021    CALCIUM 9.5 07/21/2021     07/21/2021    K 4.2 07/21/2021    CO2 26.0 07/21/2021     07/21/2021    BUN 10 07/21/2021    CREATININE 0.61 07/21/2021    EGFRIFNONA 106 07/21/2021    BCR 16.4 07/21/2021    ANIONGAP 10.0 07/21/2021     No results found for: CHOL, CHLPL, TRIG, HDL, LDL, LDLDIRECT      Current Medication List  This medication list has been reviewed with the patient and evaluated for any interactions or necessary modifications/recommendations, and updated to include all prescription medications, OTC medications, and supplements the patient is currently taking.  This list reflects what is contained in the patient's profile, which has also been marked as reviewed to communicate to other providers it is the most up to date version of the patient's current medication therapy.     Current Outpatient Medications:   •  cloNIDine (CATAPRES) 0.1 MG tablet, Take 0.1 mg by mouth 2 (Two) Times a Day As Needed for High Blood Pressure., Disp: , Rfl:   •  Continuous Blood Gluc Sensor (FreeStyle Damon 2 Sensor) misc, Use 1 sensor Every 14 (Fourteen) Days., Disp: 6 each, Rfl: 1  •  ergocalciferol (ERGOCALCIFEROL) 1.25 MG (75320 UT) capsule, Take 1 capsule by mouth 1 (One) Time Per Week., Disp: , Rfl:   •  ezetimibe (Zetia) 10 MG tablet, Take 1 tablet by mouth Daily., Disp: 30 tablet, Rfl: 1  •  fluticasone (FLONASE) 50 MCG/ACT nasal spray, 1-2 sprays into the nostril(s) as directed by provider As Needed., Disp: , Rfl:   •  gabapentin (NEURONTIN) 300 MG capsule, Take 600 mg by mouth  Every Night., Disp: , Rfl:   •  hydrOXYzine (ATARAX) 10 MG tablet, Take 10 mg by mouth 3 (Three) Times a Day As Needed for Anxiety., Disp: , Rfl:   •  insulin degludec (Tresiba FlexTouch) 100 UNIT/ML solution pen-injector injection, Inject 50 Units under the skin into the appropriate area as directed Every Night. Inject up to 50 units nightly, Disp: 15 mL, Rfl: 5  •  Insulin Lispro, 1 Unit Dial, (HumaLOG KwikPen) 100 UNIT/ML solution pen-injector, Inject 35 Units under the skin into the appropriate area as directed 3 (Three) Times a Day With Meals., Disp: 30 mL, Rfl: 5  •  lisinopril (PRINIVIL,ZESTRIL) 20 MG tablet, Take 20 mg by mouth Daily., Disp: , Rfl:   •  meloxicam (MOBIC) 7.5 MG tablet, Take 7.5 mg by mouth Daily As Needed., Disp: , Rfl:   •  methocarbamol (ROBAXIN) 500 MG tablet, Take 500 mg by mouth 4 (Four) Times a Day As Needed for Muscle Spasms., Disp: , Rfl:   •  ondansetron ODT (ZOFRAN-ODT) 4 MG disintegrating tablet, Take 1 tablet by mouth Every 6 (Six) Hours As Needed for nausea or vomiting., Disp: 10 tablet, Rfl: 0  •  DULoxetine (CYMBALTA) 30 MG capsule, Take 1 capsule by mouth Daily., Disp: 30 capsule, Rfl: 1  •  Insulin Pen Needle 32G X 6 MM misc, Use to inject insulin 4 times daily as directed., Disp: 100 each, Rfl: 5  •  Semaglutide,0.25 or 0.5MG/DOS, (Ozempic, 0.25 or 0.5 MG/DOSE,) 2 MG/1.5ML solution pen-injector, Inject 0.25 mg under the skin into the appropriate area as directed 1 (One) Time Per Week., Disp: 1.5 mL, Rfl: 3    Medicines reviewed by Marianela Duncan [facility] on 12/12/2022 at  8:49 AM    Drug Interactions  None relevant to diabetes care    Adverse Drug Reactions  • Adverse Reactions Experienced: GI side effects with Ozempic  • Plan for ADR Management: Pharmacist to consult provider    Recommended Medications Assessment  • Aspirin - Not Indicated  • Statin - Contraindicated (patient tried rosuvastatin and could not tolerate due to leg cramps/muscle pain, she is taking  Zetia)  • ACEi/ARB - Currently Taking    Hospitalizations and Urgent Care Since Last Assessment  • Hospitalizations or Admissions: None   • ED Visits: None  • Urgent Office Visits: None    Adherence and Self-Administration  Adherence related patient's specialty therapy was discussed with the patient. The Adherence segment of this outreach has been reviewed and updated.     • Ongoing or New Barriers to Patient Adherence and/or Self-Administration: Cost  • Methods for Supporting Patient Adherence and/or Self-Administration: Enrolled in patient assistance program     Goals of Therapy  Goals related to the patient's specialty therapy was discussed with the patient. The Patient Goals segment of this outreach has been reviewed and updated.    Goals     • Consistently take medications as prescribed     • HEMOGLOBIN A1C < 7            Quality of Life Assessment   Quality of Life related to the patient's specialty therapy was discussed with the patient. The QOL segment of this outreach has been reviewed and updated.      9 - a good deal better    Reassessment Plan & Follow-Up  1. Diabetes has improved with an A1c down to 6.8% from 7.3%. Patient has not been on any medications for ~ 1 month due to illness. She has slowly started to reintroduce foods and medications.   2. Medication Therapy Changes:   · Decrease to Ozempic 0.25 mg weekly   · Continue Humalog   · Continue Tresiba  3. Additional Plans, Therapy Recommendations, or Therapy Problems to Be Addressed:   · [facility]   4. Pharmacist to perform regular reassessments no more than (6) months from the previous assessment.  5. Care Coordinator to set up future refill outreaches, coordinate prescription delivery, and escalate clinical questions to pharmacist.     Attestation  I attest that the specialty medication(s) addressed above are appropriate for the patient based on my reassessment.  If the prescribed therapy  is at any point deemed not appropriate based on the current or future assessments, a consultation will be initiated with the patient's specialty care provider to determine the best course of action. The revised plan of therapy will be documented along with any additional patient education provided.     Marianela Duncan PharmD   12/12/2022  09:47 EST

## 2022-12-12 NOTE — PROGRESS NOTES
Chief Complaint   Patient presents with   • Diabetes     F/u        Janet Hurtado is a 47 y.o. female had concerns including Diabetes (F/u).    T2DM.    Diabetes was diagnosed 10 years ago.  Complications include pain/tingling/burning in her legs, ankles, feet and fingertips, had pressure in her eyes at her most recent eye exam, has seen nephrology and all was well.  Last ophtho exam was Feb 2022, [facility]  Current medications for diabetes include 35 units Tresiba at night, Humalog 35 units TID with meals, Ozempic 0.5 mg weekly-but has not been taking any medication in the last month.  Previous meds: Trulicity-GI Issues, SGLT-2-yeast infections, Metformin-GI Issues, glipizide-stopped when started insulin  She checks her blood sugar up to frequently with ONE Changee CGM.   Hypos: never    ACE/ARB:yes, Statin: No  Labs:  A1C: 2/22/2022: 11.9   Lipid Panel: Up-to-date  ZANA: Needs done    She recently had an A1c done at PCP that was 6.8 (10/25/2022).  She was sick with GI issues and thought that it was from her Ozempic then got the flu and stopped all of her medication.  She has since started back on her meal time insulin but no other medications at this time.     The following portions of the patient's history were reviewed and updated as appropriate: allergies, current medications, past family history, past medical history, past social history, past surgical history and problem list.    She states that her diet is not great at this moment.  At one point she was on a keto and had her blood sugars on the 100s but since her nephew passed away over a year ago she has let herself go and has not been taking care of her personal medical needs.    Past Medical History:   Diagnosis Date   • Depression    • Diabetes mellitus (HCC)    • Fibromyalgia    • Hypertension    • Uterine cancer (HCC)     2008     Past Surgical History:   Procedure Laterality Date   • ABDOMINAL SURGERY     • BREAST BIOPSY Left 2014   •   SECTION     • CHOLECYSTECTOMY     • HYSTERECTOMY        Family History   Problem Relation Age of Onset   • No Known Problems Mother    • No Known Problems Father    • No Known Problems Sister    • No Known Problems Brother    • No Known Problems Son    • No Known Problems Daughter    • No Known Problems Maternal Grandmother    • No Known Problems Maternal Grandfather    • No Known Problems Paternal Grandmother    • No Known Problems Paternal Grandfather    • No Known Problems Cousin    • Breast cancer Maternal Aunt    • Rheum arthritis Neg Hx    • Osteoarthritis Neg Hx    • Asthma Neg Hx    • Diabetes Neg Hx    • Heart failure Neg Hx    • Hyperlipidemia Neg Hx    • Hypertension Neg Hx    • Migraines Neg Hx    • Rashes / Skin problems Neg Hx    • Seizures Neg Hx    • Stroke Neg Hx    • Thyroid disease Neg Hx       Social History     Socioeconomic History   • Marital status:    Tobacco Use   • Smoking status: Never   • Smokeless tobacco: Never   Vaping Use   • Vaping Use: Never used   Substance and Sexual Activity   • Alcohol use: No   • Drug use: No   • Sexual activity: Defer      No Known Allergies   Current Outpatient Medications on File Prior to Visit   Medication Sig Dispense Refill   • cloNIDine (CATAPRES) 0.1 MG tablet Take 0.1 mg by mouth 2 (Two) Times a Day As Needed for High Blood Pressure.     • DULoxetine (CYMBALTA) 30 MG capsule Take 1 capsule by mouth Daily. 30 capsule 1   • ergocalciferol (ERGOCALCIFEROL) 1.25 MG (89656 UT) capsule Take 1 capsule by mouth 1 (One) Time Per Week.     • ezetimibe (Zetia) 10 MG tablet Take 1 tablet by mouth Daily. 30 tablet 1   • fluticasone (FLONASE) 50 MCG/ACT nasal spray 1-2 sprays into the nostril(s) as directed by provider As Needed.     • gabapentin (NEURONTIN) 300 MG capsule Take 600 mg by mouth Every Night.     • hydrOXYzine (ATARAX) 10 MG tablet Take 10 mg by mouth 3 (Three) Times a Day As Needed for Anxiety.     • lisinopril (PRINIVIL,ZESTRIL)  20 MG tablet Take 20 mg by mouth Daily.     • meloxicam (MOBIC) 7.5 MG tablet Take 7.5 mg by mouth Daily As Needed.     • methocarbamol (ROBAXIN) 500 MG tablet Take 500 mg by mouth 4 (Four) Times a Day As Needed for Muscle Spasms.     • ondansetron ODT (ZOFRAN-ODT) 4 MG disintegrating tablet Take 1 tablet by mouth Every 6 (Six) Hours As Needed for nausea or vomiting. 10 tablet 0   • [DISCONTINUED] Continuous Blood Gluc Sensor (FreeStyle Damon 2 Sensor) misc Use 1 sensor Every 14 (Fourteen) Days. 6 each 1   • [DISCONTINUED] gabapentin (NEURONTIN) 100 MG capsule Take 200 mg by mouth Every Night.     • [DISCONTINUED] insulin degludec (Tresiba FlexTouch) 100 UNIT/ML solution pen-injector injection Inject 50 Units under the skin into the appropriate area as directed Every Night. Inject up to 50 units nightly 15 mL 5   • [DISCONTINUED] Insulin Lispro, 1 Unit Dial, (HumaLOG KwikPen) 100 UNIT/ML solution pen-injector Inject 35 Units under the skin into the appropriate area as directed 3 (Three) Times a Day With Meals. (Patient taking differently: Inject 30-35 Units under the skin into the appropriate area as directed As Needed.) 30 mL 5   • [DISCONTINUED] Insulin Pen Needle (Pen Needles) 32G X 4 MM misc Use to inject insulin 4 times daily as directed. 100 each 5   • [DISCONTINUED] rosuvastatin (Crestor) 5 MG tablet Take 1 tablet by mouth Every Night. 30 tablet 2   • [DISCONTINUED] Semaglutide, 1 MG/DOSE, (Ozempic, 1 MG/DOSE,) 4 MG/3ML solution pen-injector Inject 1 mg under the skin into the appropriate area as directed 1 (One) Time Per Week. 3 mL 5     No current facility-administered medications on file prior to visit.        Review of Systems   Constitutional: Positive for activity change, appetite change, fatigue and unexpected weight loss.   Eyes: Positive for blurred vision.   Gastrointestinal: Positive for abdominal pain.   Endocrine: Positive for polydipsia and polyuria.   Neurological: Positive for numbness.  "  Psychiatric/Behavioral: Positive for sleep disturbance.   All other systems reviewed and are negative.    /68 (BP Location: Left arm, Patient Position: Sitting, Cuff Size: Adult)   Pulse 58   Ht 165.1 cm (65\")   Wt 101 kg (223 lb 9.6 oz)   LMP  (LMP Unknown)   SpO2 97%   BMI 37.21 kg/m²      Physical Exam  Vitals reviewed.   Constitutional:       Appearance: Normal appearance.   Eyes:      Extraocular Movements: Extraocular movements intact.   Cardiovascular:      Rate and Rhythm: Normal rate.      Pulses: Normal pulses.   Pulmonary:      Effort: Pulmonary effort is normal.   Skin:     General: Skin is warm.   Neurological:      Mental Status: She is alert and oriented to person, place, and time.   Psychiatric:         Mood and Affect: Mood normal.         Behavior: Behavior normal.         Thought Content: Thought content normal.         Judgment: Judgment normal.       CMP:  Lab Results   Component Value Date    BUN 10 07/21/2021    CREATININE 0.61 07/21/2021    EGFRIFNONA 106 07/21/2021    BCR 16.4 07/21/2021     07/21/2021    K 4.2 07/21/2021    CO2 26.0 07/21/2021    CALCIUM 9.5 07/21/2021    ALBUMIN 3.92 07/21/2021    BILITOT 0.6 07/21/2021    ALKPHOS 61 07/21/2021    AST 26 07/21/2021    ALT 28 07/21/2021     Lipid Panel:  No results found for: CHOL, TRIG, HDL, VLDL, LDL  HbA1c:  Lab Results   Component Value Date    HGBA1C 10.3 05/25/2022    HGBA1C 10.20 (H) 09/24/2018     Glucose:  Lab Results   Component Value Date    POCGLU 158 (A) 12/12/2022     Microalbumin:  No results found for: MALBCRERATIO  TSH:  Lab Results   Component Value Date    TSH 0.928 07/21/2021       Assessment and Plan    Diagnoses and all orders for this visit:    1. Type 2 diabetes mellitus with hyperglycemia, with long-term current use of insulin (HCC) (Primary)  Assessment & Plan:  -Diabetes is improving with A1C down to 6.8.  -Discussed in length with the patient the dietary and exercise guidelines.    -Discussed " importance of yearly eye exams.  She will keep the scheduled appointments.  -Discussed importance of checking BG's. Continue using Freestyle Damon.  -Discussed importance of notifying office if her insulin is running low or out of date.  Discussed the importance of taking her medication regularly and not missing doses to keep her blood sugars under control.  -Continue Tresiba 25 units qhs.  -Continue Humalog 5-10 units TID with meals based on carbs consuming.  -Start Ozempic 0.25 mg weekly.  Patient has no personal history of pancreatitis, no family history of MEN syndrome or medullary thyroid cancer. Possible side effects including nausea, bloating, other GI upset and rarely pancreatitis were discussed. She was advised to call the office with any symptoms or concerns.   -S/S hypoglycemia reviewed with Rule of 15's advised.  -Follow-up in 3 months    Orders:  -     POC Glucose Fingerstick       Return in about 3 months (around 3/12/2023) for Follow-up appointment, A1C. The patient was instructed to contact the clinic with any interval questions or concerns.    This document has been electronically signed by HAVEN Montelongo  December 12, 2022 12:33 EST  Endocrinology

## 2022-12-16 ENCOUNTER — HOSPITAL ENCOUNTER (OUTPATIENT)
Dept: MRI IMAGING | Facility: HOSPITAL | Age: 47
Discharge: HOME | End: 2022-12-16
Admitting: NURSE PRACTITIONER

## 2022-12-16 DIAGNOSIS — M54.42 ACUTE BACK PAIN WITH SCIATICA, LEFT: ICD-10-CM

## 2022-12-16 PROCEDURE — 72148 MRI LUMBAR SPINE W/O DYE: CPT | Performed by: RADIOLOGY

## 2022-12-16 PROCEDURE — 72148 MRI LUMBAR SPINE W/O DYE: CPT

## 2022-12-20 ENCOUNTER — TRANSCRIBE ORDERS (OUTPATIENT)
Dept: ADMINISTRATIVE | Facility: HOSPITAL | Age: 47
End: 2022-12-20

## 2022-12-20 DIAGNOSIS — R93.7 ABNORMAL MRI, LUMBAR SPINE: Primary | ICD-10-CM

## 2022-12-29 ENCOUNTER — HOSPITAL ENCOUNTER (OUTPATIENT)
Dept: NUCLEAR MEDICINE | Facility: HOSPITAL | Age: 47
Discharge: HOME | End: 2022-12-29

## 2022-12-29 DIAGNOSIS — R93.7 ABNORMAL MRI, LUMBAR SPINE: ICD-10-CM

## 2022-12-29 PROCEDURE — 78300 BONE IMAGING LIMITED AREA: CPT | Performed by: RADIOLOGY

## 2022-12-29 PROCEDURE — 78300 BONE IMAGING LIMITED AREA: CPT

## 2022-12-29 PROCEDURE — 0 TECHNETIUM OXIDRONATE KIT: Performed by: NURSE PRACTITIONER

## 2022-12-29 PROCEDURE — A9561 TC99M OXIDRONATE: HCPCS | Performed by: NURSE PRACTITIONER

## 2022-12-29 RX ADMIN — TECHNETIUM TC 99M OXIDRONATE 1 DOSE: 3.15 INJECTION, POWDER, LYOPHILIZED, FOR SOLUTION INTRAVENOUS at 11:35

## 2023-01-06 ENCOUNTER — SPECIALTY PHARMACY (OUTPATIENT)
Dept: PHARMACY | Facility: HOSPITAL | Age: 48
End: 2023-01-06
Payer: COMMERCIAL

## 2023-02-01 ENCOUNTER — SPECIALTY PHARMACY (OUTPATIENT)
Dept: PHARMACY | Facility: HOSPITAL | Age: 48
End: 2023-02-01
Payer: COMMERCIAL

## 2023-02-01 RX ORDER — OXYCODONE AND ACETAMINOPHEN 7.5; 325 MG/1; MG/1
1 TABLET ORAL EVERY 8 HOURS PRN
COMMUNITY

## 2023-02-01 RX ORDER — PEN NEEDLE, DIABETIC 30 GX3/16"
1 NEEDLE, DISPOSABLE MISCELLANEOUS 4 TIMES DAILY
Qty: 100 EACH | Refills: 5 | Status: SHIPPED | OUTPATIENT
Start: 2023-02-01

## 2023-02-01 RX ORDER — CYCLOBENZAPRINE HCL 10 MG
10 TABLET ORAL 3 TIMES DAILY PRN
COMMUNITY

## 2023-02-01 NOTE — PROGRESS NOTES
Spoke with patient. 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 Her surgeon instructed her to stop taking Ozempic during surgery/recovery. She started taking cyclobenzaprine 10 mg TID and percocet 7.5/325 mg TID PRN after surgery. Updated medication list. New medications are not an issue for diabetes management. She is managing her diabetes with insulin at this time. Will inform provider of changes with Ozempic.     Marianela Duncan, PharmD  02/01/23  09:42 EST    
175

## 2023-03-23 ENCOUNTER — SPECIALTY PHARMACY (OUTPATIENT)
Dept: PHARMACY | Facility: HOSPITAL | Age: 48
End: 2023-03-23
Payer: COMMERCIAL

## 2023-03-23 RX ORDER — INSULIN LISPRO 100 [IU]/ML
35 INJECTION, SOLUTION INTRAVENOUS; SUBCUTANEOUS
Qty: 30 ML | Refills: 5 | Status: SHIPPED | OUTPATIENT
Start: 2023-03-23

## 2023-03-23 RX ORDER — SEMAGLUTIDE 1.34 MG/ML
0.25 INJECTION, SOLUTION SUBCUTANEOUS WEEKLY
Qty: 1.5 ML | Refills: 3 | Status: SHIPPED | OUTPATIENT
Start: 2023-03-23

## 2023-03-23 RX ORDER — INSULIN DEGLUDEC INJECTION 100 U/ML
50 INJECTION, SOLUTION SUBCUTANEOUS NIGHTLY
Qty: 15 ML | Refills: 5 | Status: SHIPPED | OUTPATIENT
Start: 2023-03-23

## 2023-03-23 NOTE — PROGRESS NOTES
Specialty Pharmacy Patient Management Program  Program Disenrollment      Janet Hurtado is a 48 y.o. female with Type 2 Diabetes seen by an Endocrinology provider at [facility]. Patient was previously enrolled in the Endocrinology Patient Management program offered by [facility].      Disenrolling patient today as her insurance requires her to fill with mail order pharmacy for cheaper co-pays. Informed providers. Sent request for prescriptions to be e-scribed to [facility].     Marianela Duncan, PharmD, [facility]  Clinical Specialty Pharmacist, Endocrinology  3/23/2023  08:33 EDT

## 2023-10-02 ENCOUNTER — TELEPHONE (OUTPATIENT)
Dept: PHARMACY | Facility: HOSPITAL | Age: 48
End: 2023-10-02
Payer: COMMERCIAL

## 2023-10-03 RX ORDER — LANCETS 33 GAUGE
1 EACH MISCELLANEOUS 3 TIMES DAILY
Qty: 100 EACH | Refills: 3 | Status: SHIPPED | OUTPATIENT
Start: 2023-10-03

## 2023-10-03 RX ORDER — BLOOD-GLUCOSE METER
1 EACH MISCELLANEOUS ONCE
Qty: 1 EACH | Refills: 0 | Status: SHIPPED | OUTPATIENT
Start: 2023-10-03 | End: 2023-10-04

## 2023-10-03 NOTE — TELEPHONE ENCOUNTER
I can send her some testing supplies to the pharmacy to start checking her BG's.  I will need to see her to evaluate where she is to get her started on something.  She can always go to her PCP to get started on something and we can adjust once I see her.

## 2023-11-03 ENCOUNTER — HOSPITAL ENCOUNTER (EMERGENCY)
Facility: HOSPITAL | Age: 48
Discharge: HOME | End: 2023-11-03
Attending: STUDENT IN AN ORGANIZED HEALTH CARE EDUCATION/TRAINING PROGRAM
Payer: MEDICAID

## 2023-11-03 ENCOUNTER — APPOINTMENT (OUTPATIENT)
Dept: CT IMAGING | Facility: HOSPITAL | Age: 48
End: 2023-11-03
Payer: MEDICAID

## 2023-11-03 VITALS
TEMPERATURE: 97.7 F | OXYGEN SATURATION: 98 % | BODY MASS INDEX: 37.15 KG/M2 | HEIGHT: 65 IN | RESPIRATION RATE: 15 BRPM | HEART RATE: 67 BPM | SYSTOLIC BLOOD PRESSURE: 117 MMHG | DIASTOLIC BLOOD PRESSURE: 57 MMHG | WEIGHT: 223 LBS

## 2023-11-03 DIAGNOSIS — N30.91 HEMORRHAGIC CYSTITIS: Primary | ICD-10-CM

## 2023-11-03 DIAGNOSIS — N12 PYELONEPHRITIS: ICD-10-CM

## 2023-11-03 LAB
ALBUMIN SERPL-MCNC: 4 G/DL (ref 3.5–5.2)
ALBUMIN/GLOB SERPL: 1.2 G/DL
ALP SERPL-CCNC: 69 U/L (ref 39–117)
ALT SERPL W P-5'-P-CCNC: 23 U/L (ref 1–33)
ANION GAP SERPL CALCULATED.3IONS-SCNC: 12.4 MMOL/L (ref 5–15)
AST SERPL-CCNC: 19 U/L (ref 1–32)
BACTERIA UR QL AUTO: ABNORMAL /HPF
BASOPHILS # BLD AUTO: 0.03 10*3/MM3 (ref 0–0.2)
BASOPHILS NFR BLD AUTO: 0.2 % (ref 0–1.5)
BILIRUB SERPL-MCNC: 0.8 MG/DL (ref 0–1.2)
BILIRUB UR QL STRIP: NEGATIVE
BUN SERPL-MCNC: 11 MG/DL (ref 6–20)
BUN/CREAT SERPL: 17.7 (ref 7–25)
CALCIUM SPEC-SCNC: 9.4 MG/DL (ref 8.6–10.5)
CHLORIDE SERPL-SCNC: 98 MMOL/L (ref 98–107)
CLARITY UR: ABNORMAL
CO2 SERPL-SCNC: 23.6 MMOL/L (ref 22–29)
COLOR UR: ABNORMAL
CREAT SERPL-MCNC: 0.62 MG/DL (ref 0.57–1)
CRP SERPL-MCNC: 4.89 MG/DL (ref 0–0.5)
D-LACTATE SERPL-SCNC: 1.9 MMOL/L (ref 0.5–2)
DEPRECATED RDW RBC AUTO: 36.5 FL (ref 37–54)
EGFRCR SERPLBLD CKD-EPI 2021: 110.7 ML/MIN/1.73
EOSINOPHIL # BLD AUTO: 0.04 10*3/MM3 (ref 0–0.4)
EOSINOPHIL NFR BLD AUTO: 0.3 % (ref 0.3–6.2)
ERYTHROCYTE [DISTWIDTH] IN BLOOD BY AUTOMATED COUNT: 12 % (ref 12.3–15.4)
ERYTHROCYTE [SEDIMENTATION RATE] IN BLOOD: 28 MM/HR (ref 0–20)
GLOBULIN UR ELPH-MCNC: 3.3 GM/DL
GLUCOSE SERPL-MCNC: 337 MG/DL (ref 65–99)
GLUCOSE UR STRIP-MCNC: ABNORMAL MG/DL
HCT VFR BLD AUTO: 42.3 % (ref 34–46.6)
HGB BLD-MCNC: 14.1 G/DL (ref 12–15.9)
HGB UR QL STRIP.AUTO: ABNORMAL
HOLD SPECIMEN: NORMAL
HOLD SPECIMEN: NORMAL
HYALINE CASTS UR QL AUTO: ABNORMAL /LPF
IMM GRANULOCYTES # BLD AUTO: 0.09 10*3/MM3 (ref 0–0.05)
IMM GRANULOCYTES NFR BLD AUTO: 0.7 % (ref 0–0.5)
KETONES UR QL STRIP: ABNORMAL
LEUKOCYTE ESTERASE UR QL STRIP.AUTO: ABNORMAL
LIPASE SERPL-CCNC: 31 U/L (ref 13–60)
LYMPHOCYTES # BLD AUTO: 1.7 10*3/MM3 (ref 0.7–3.1)
LYMPHOCYTES NFR BLD AUTO: 12.8 % (ref 19.6–45.3)
MAGNESIUM SERPL-MCNC: 1.8 MG/DL (ref 1.6–2.6)
MCH RBC QN AUTO: 27.4 PG (ref 26.6–33)
MCHC RBC AUTO-ENTMCNC: 33.3 G/DL (ref 31.5–35.7)
MCV RBC AUTO: 82.3 FL (ref 79–97)
MONOCYTES # BLD AUTO: 0.71 10*3/MM3 (ref 0.1–0.9)
MONOCYTES NFR BLD AUTO: 5.4 % (ref 5–12)
NEUTROPHILS NFR BLD AUTO: 10.69 10*3/MM3 (ref 1.7–7)
NEUTROPHILS NFR BLD AUTO: 80.6 % (ref 42.7–76)
NITRITE UR QL STRIP: NEGATIVE
NRBC BLD AUTO-RTO: 0 /100 WBC (ref 0–0.2)
PH UR STRIP.AUTO: 6.5 [PH] (ref 5–8)
PLATELET # BLD AUTO: 184 10*3/MM3 (ref 140–450)
PMV BLD AUTO: 10.3 FL (ref 6–12)
POTASSIUM SERPL-SCNC: 4.5 MMOL/L (ref 3.5–5.2)
PROCALCITONIN SERPL-MCNC: 0.05 NG/ML (ref 0–0.25)
PROT SERPL-MCNC: 7.3 G/DL (ref 6–8.5)
PROT UR QL STRIP: ABNORMAL
RBC # BLD AUTO: 5.14 10*6/MM3 (ref 3.77–5.28)
RBC # UR STRIP: ABNORMAL /HPF
REF LAB TEST METHOD: ABNORMAL
SODIUM SERPL-SCNC: 134 MMOL/L (ref 136–145)
SP GR UR STRIP: 1.02 (ref 1–1.03)
SQUAMOUS #/AREA URNS HPF: ABNORMAL /HPF
UROBILINOGEN UR QL STRIP: ABNORMAL
WBC # UR STRIP: ABNORMAL /HPF
WBC NRBC COR # BLD: 13.26 10*3/MM3 (ref 3.4–10.8)
WHOLE BLOOD HOLD COAG: NORMAL
WHOLE BLOOD HOLD SPECIMEN: NORMAL

## 2023-11-03 PROCEDURE — 83690 ASSAY OF LIPASE: CPT | Performed by: PHYSICIAN ASSISTANT

## 2023-11-03 PROCEDURE — 74178 CT ABD&PLV WO CNTR FLWD CNTR: CPT | Performed by: RADIOLOGY

## 2023-11-03 PROCEDURE — 25010000002 MORPHINE PER 10 MG: Performed by: STUDENT IN AN ORGANIZED HEALTH CARE EDUCATION/TRAINING PROGRAM

## 2023-11-03 PROCEDURE — 25010000002 KETOROLAC TROMETHAMINE PER 15 MG: Performed by: PHYSICIAN ASSISTANT

## 2023-11-03 PROCEDURE — 99285 EMERGENCY DEPT VISIT HI MDM: CPT

## 2023-11-03 PROCEDURE — 36415 COLL VENOUS BLD VENIPUNCTURE: CPT

## 2023-11-03 PROCEDURE — 87086 URINE CULTURE/COLONY COUNT: CPT | Performed by: PHYSICIAN ASSISTANT

## 2023-11-03 PROCEDURE — 25510000001 IOPAMIDOL 61 % SOLUTION: Performed by: STUDENT IN AN ORGANIZED HEALTH CARE EDUCATION/TRAINING PROGRAM

## 2023-11-03 PROCEDURE — 83735 ASSAY OF MAGNESIUM: CPT | Performed by: PHYSICIAN ASSISTANT

## 2023-11-03 PROCEDURE — 87186 SC STD MICRODIL/AGAR DIL: CPT | Performed by: PHYSICIAN ASSISTANT

## 2023-11-03 PROCEDURE — 74178 CT ABD&PLV WO CNTR FLWD CNTR: CPT

## 2023-11-03 PROCEDURE — 96365 THER/PROPH/DIAG IV INF INIT: CPT

## 2023-11-03 PROCEDURE — 85025 COMPLETE CBC W/AUTO DIFF WBC: CPT | Performed by: PHYSICIAN ASSISTANT

## 2023-11-03 PROCEDURE — 81001 URINALYSIS AUTO W/SCOPE: CPT | Performed by: PHYSICIAN ASSISTANT

## 2023-11-03 PROCEDURE — 85652 RBC SED RATE AUTOMATED: CPT | Performed by: PHYSICIAN ASSISTANT

## 2023-11-03 PROCEDURE — 25810000003 SODIUM CHLORIDE 0.9 % SOLUTION: Performed by: PHYSICIAN ASSISTANT

## 2023-11-03 PROCEDURE — 25010000002 CEFTRIAXONE PER 250 MG: Performed by: PHYSICIAN ASSISTANT

## 2023-11-03 PROCEDURE — 80053 COMPREHEN METABOLIC PANEL: CPT | Performed by: PHYSICIAN ASSISTANT

## 2023-11-03 PROCEDURE — 84145 PROCALCITONIN (PCT): CPT | Performed by: PHYSICIAN ASSISTANT

## 2023-11-03 PROCEDURE — 83605 ASSAY OF LACTIC ACID: CPT | Performed by: PHYSICIAN ASSISTANT

## 2023-11-03 PROCEDURE — 87040 BLOOD CULTURE FOR BACTERIA: CPT | Performed by: PHYSICIAN ASSISTANT

## 2023-11-03 PROCEDURE — 25010000002 ONDANSETRON PER 1 MG: Performed by: PHYSICIAN ASSISTANT

## 2023-11-03 PROCEDURE — 96375 TX/PRO/DX INJ NEW DRUG ADDON: CPT

## 2023-11-03 PROCEDURE — 86140 C-REACTIVE PROTEIN: CPT | Performed by: PHYSICIAN ASSISTANT

## 2023-11-03 RX ORDER — ONDANSETRON 2 MG/ML
4 INJECTION INTRAMUSCULAR; INTRAVENOUS ONCE
Status: COMPLETED | OUTPATIENT
Start: 2023-11-03 | End: 2023-11-03

## 2023-11-03 RX ORDER — KETOROLAC TROMETHAMINE 30 MG/ML
30 INJECTION, SOLUTION INTRAMUSCULAR; INTRAVENOUS ONCE
Status: COMPLETED | OUTPATIENT
Start: 2023-11-03 | End: 2023-11-03

## 2023-11-03 RX ORDER — CLONIDINE HYDROCHLORIDE 0.1 MG/1
0.1 TABLET ORAL ONCE
Status: COMPLETED | OUTPATIENT
Start: 2023-11-03 | End: 2023-11-03

## 2023-11-03 RX ORDER — SODIUM CHLORIDE 0.9 % (FLUSH) 0.9 %
10 SYRINGE (ML) INJECTION AS NEEDED
Status: DISCONTINUED | OUTPATIENT
Start: 2023-11-03 | End: 2023-11-03 | Stop reason: HOSPADM

## 2023-11-03 RX ORDER — PHENAZOPYRIDINE HYDROCHLORIDE 200 MG/1
200 TABLET, FILM COATED ORAL 3 TIMES DAILY PRN
Qty: 6 TABLET | Refills: 0 | Status: SHIPPED | OUTPATIENT
Start: 2023-11-03

## 2023-11-03 RX ORDER — HYDROCHLOROTHIAZIDE 25 MG/1
12.5 TABLET ORAL ONCE
Status: COMPLETED | OUTPATIENT
Start: 2023-11-03 | End: 2023-11-03

## 2023-11-03 RX ORDER — LISINOPRIL 10 MG/1
20 TABLET ORAL ONCE
Status: COMPLETED | OUTPATIENT
Start: 2023-11-03 | End: 2023-11-03

## 2023-11-03 RX ORDER — CEFDINIR 300 MG/1
300 CAPSULE ORAL 2 TIMES DAILY
Qty: 14 CAPSULE | Refills: 0 | Status: SHIPPED | OUTPATIENT
Start: 2023-11-03 | End: 2023-11-10

## 2023-11-03 RX ADMIN — KETOROLAC TROMETHAMINE 30 MG: 30 INJECTION, SOLUTION INTRAMUSCULAR; INTRAVENOUS at 11:26

## 2023-11-03 RX ADMIN — ONDANSETRON 4 MG: 2 INJECTION INTRAMUSCULAR; INTRAVENOUS at 08:28

## 2023-11-03 RX ADMIN — IOPAMIDOL 85 ML: 612 INJECTION, SOLUTION INTRAVENOUS at 10:11

## 2023-11-03 RX ADMIN — CLONIDINE HYDROCHLORIDE 0.1 MG: 0.1 TABLET ORAL at 10:41

## 2023-11-03 RX ADMIN — CEFTRIAXONE 2000 MG: 2 INJECTION, POWDER, FOR SOLUTION INTRAMUSCULAR; INTRAVENOUS at 11:26

## 2023-11-03 RX ADMIN — MORPHINE SULFATE 4 MG: 4 INJECTION, SOLUTION INTRAMUSCULAR; INTRAVENOUS at 08:28

## 2023-11-03 RX ADMIN — LISINOPRIL 20 MG: 10 TABLET ORAL at 11:26

## 2023-11-03 RX ADMIN — HYDROCHLOROTHIAZIDE 12.5 MG: 25 TABLET ORAL at 11:25

## 2023-11-03 RX ADMIN — SODIUM CHLORIDE 1000 ML: 9 INJECTION, SOLUTION INTRAVENOUS at 08:28

## 2023-11-03 NOTE — DISCHARGE INSTRUCTIONS
Rest at home, make sure you are drinking plenty of fluids.  Continue your home medications as prescribed.  A prescription for an antibiotic has been sent to your pharmacy.  Start this tomorrow morning.  Pyridium was sent to your pharmacy as well and you can start that today as needed.  Follow-up with your family doctor in the office at the next available appointment.  Return to the ED at any time if symptoms change or worsen.

## 2023-11-03 NOTE — ED PROVIDER NOTES
Subjective   History of Present Illness  48-year-old female who presents to the ED today for blood in her urine.  She states her symptoms started yesterday morning.  She states she had blood in her urine all day and even passed some clots.  She states she started to feel ill last night.  She states at 4 AM today she started to have severe bilateral lower back pain and a lot of pressure in the suprapubic area.  She states this has been constant.  She denies any fever.  She denies any nausea or vomiting.  She tried Gas-X and MiraLAX at home with no relief.  She was noted to be hypertensive upon arrival to the ED and reports that she did not take her daily blood pressure medication today.  She does have a history of hypertension, diabetes and hyperlipidemia.    History provided by:  Patient  Blood in Urine  This is a new problem. The current episode started yesterday. The problem is unchanged. She describes the hematuria as gross hematuria. The hematuria occurs throughout her entire urinary stream. Her pain is at a severity of 10/10. The pain is severe. Associated symptoms include abdominal pain, bladder pain, chills and flank pain. Pertinent negatives include no dysuria, facial swelling, fever, genital pain, hesitancy, inability to urinate, nausea, urinary retention or vomiting.       Review of Systems   Constitutional:  Positive for chills. Negative for fever.   HENT: Negative.  Negative for facial swelling.    Eyes: Negative.    Respiratory: Negative.     Cardiovascular: Negative.    Gastrointestinal:  Positive for abdominal pain. Negative for diarrhea, nausea and vomiting.   Genitourinary:  Positive for flank pain, hematuria and pelvic pain. Negative for difficulty urinating, dysuria and hesitancy.   Musculoskeletal:  Positive for back pain.   Skin: Negative.    Neurological: Negative.    Psychiatric/Behavioral: Negative.     All other systems reviewed and are negative.      Past Medical History:   Diagnosis Date     Depression     Diabetes mellitus     Fibromyalgia     Hypertension     Uterine cancer     2008       No Known Allergies    Past Surgical History:   Procedure Laterality Date    ABDOMINAL SURGERY      BREAST BIOPSY Left 2014     SECTION      CHOLECYSTECTOMY      HYSTERECTOMY  2008       Family History   Problem Relation Age of Onset    No Known Problems Mother     No Known Problems Father     No Known Problems Sister     No Known Problems Brother     No Known Problems Son     No Known Problems Daughter     No Known Problems Maternal Grandmother     No Known Problems Maternal Grandfather     No Known Problems Paternal Grandmother     No Known Problems Paternal Grandfather     No Known Problems Cousin     Breast cancer Maternal Aunt     Rheum arthritis Neg Hx     Osteoarthritis Neg Hx     Asthma Neg Hx     Diabetes Neg Hx     Heart failure Neg Hx     Hyperlipidemia Neg Hx     Hypertension Neg Hx     Migraines Neg Hx     Rashes / Skin problems Neg Hx     Seizures Neg Hx     Stroke Neg Hx     Thyroid disease Neg Hx        Social History     Socioeconomic History    Marital status:    Tobacco Use    Smoking status: Never    Smokeless tobacco: Never   Vaping Use    Vaping Use: Never used   Substance and Sexual Activity    Alcohol use: No    Drug use: No    Sexual activity: Defer           Objective   Physical Exam  Vitals and nursing note reviewed.   Constitutional:       General: She is in acute distress (appears to be in pain, tearful).      Appearance: She is well-developed. She is obese. She is not diaphoretic.   HENT:      Head: Normocephalic and atraumatic.   Eyes:      Extraocular Movements: Extraocular movements intact.      Pupils: Pupils are equal, round, and reactive to light.   Cardiovascular:      Rate and Rhythm: Normal rate and regular rhythm.      Heart sounds: Normal heart sounds.   Pulmonary:      Effort: Pulmonary effort is normal.      Breath sounds: Normal breath sounds. No wheezing  or rhonchi.   Chest:      Chest wall: No tenderness.   Abdominal:      General: Bowel sounds are normal.      Palpations: Abdomen is soft.      Tenderness: There is abdominal tenderness in the suprapubic area. There is right CVA tenderness and left CVA tenderness. There is no guarding or rebound.   Skin:     General: Skin is warm and dry.      Capillary Refill: Capillary refill takes less than 2 seconds.   Neurological:      General: No focal deficit present.      Mental Status: She is alert and oriented to person, place, and time.   Psychiatric:         Mood and Affect: Mood normal.         Procedures       Results for orders placed or performed during the hospital encounter of 11/03/23   Comprehensive Metabolic Panel    Specimen: Blood   Result Value Ref Range    Glucose 337 (H) 65 - 99 mg/dL    BUN 11 6 - 20 mg/dL    Creatinine 0.62 0.57 - 1.00 mg/dL    Sodium 134 (L) 136 - 145 mmol/L    Potassium 4.5 3.5 - 5.2 mmol/L    Chloride 98 98 - 107 mmol/L    CO2 23.6 22.0 - 29.0 mmol/L    Calcium 9.4 8.6 - 10.5 mg/dL    Total Protein 7.3 6.0 - 8.5 g/dL    Albumin 4.0 3.5 - 5.2 g/dL    ALT (SGPT) 23 1 - 33 U/L    AST (SGOT) 19 1 - 32 U/L    Alkaline Phosphatase 69 39 - 117 U/L    Total Bilirubin 0.8 0.0 - 1.2 mg/dL    Globulin 3.3 gm/dL    A/G Ratio 1.2 g/dL    BUN/Creatinine Ratio 17.7 7.0 - 25.0    Anion Gap 12.4 5.0 - 15.0 mmol/L    eGFR 110.7 >60.0 mL/min/1.73   Lipase    Specimen: Blood   Result Value Ref Range    Lipase 31 13 - 60 U/L   Urinalysis With Culture If Indicated - Urine, Clean Catch    Specimen: Urine, Clean Catch   Result Value Ref Range    Color, UA Dark Yellow (A) Yellow, Straw    Appearance, UA Turbid (A) Clear    pH, UA 6.5 5.0 - 8.0    Specific Gravity, UA 1.025 1.005 - 1.030    Glucose, UA >=1000 mg/dL (3+) (A) Negative    Ketones, UA 15 mg/dL (1+) (A) Negative    Bilirubin, UA Negative Negative    Blood, UA Large (3+) (A) Negative    Protein, UA >=300 mg/dL (3+) (A) Negative    Leuk Esterase, UA  Small (1+) (A) Negative    Nitrite, UA Negative Negative    Urobilinogen, UA 1.0 E.U./dL 0.2 - 1.0 E.U./dL   Procalcitonin    Specimen: Blood   Result Value Ref Range    Procalcitonin 0.05 0.00 - 0.25 ng/mL   Sedimentation Rate    Specimen: Blood   Result Value Ref Range    Sed Rate 28 (H) 0 - 20 mm/hr   C-reactive Protein    Specimen: Blood   Result Value Ref Range    C-Reactive Protein 4.89 (H) 0.00 - 0.50 mg/dL   Magnesium    Specimen: Blood   Result Value Ref Range    Magnesium 1.8 1.6 - 2.6 mg/dL   CBC Auto Differential    Specimen: Blood   Result Value Ref Range    WBC 13.26 (H) 3.40 - 10.80 10*3/mm3    RBC 5.14 3.77 - 5.28 10*6/mm3    Hemoglobin 14.1 12.0 - 15.9 g/dL    Hematocrit 42.3 34.0 - 46.6 %    MCV 82.3 79.0 - 97.0 fL    MCH 27.4 26.6 - 33.0 pg    MCHC 33.3 31.5 - 35.7 g/dL    RDW 12.0 (L) 12.3 - 15.4 %    RDW-SD 36.5 (L) 37.0 - 54.0 fl    MPV 10.3 6.0 - 12.0 fL    Platelets 184 140 - 450 10*3/mm3    Neutrophil % 80.6 (H) 42.7 - 76.0 %    Lymphocyte % 12.8 (L) 19.6 - 45.3 %    Monocyte % 5.4 5.0 - 12.0 %    Eosinophil % 0.3 0.3 - 6.2 %    Basophil % 0.2 0.0 - 1.5 %    Immature Grans % 0.7 (H) 0.0 - 0.5 %    Neutrophils, Absolute 10.69 (H) 1.70 - 7.00 10*3/mm3    Lymphocytes, Absolute 1.70 0.70 - 3.10 10*3/mm3    Monocytes, Absolute 0.71 0.10 - 0.90 10*3/mm3    Eosinophils, Absolute 0.04 0.00 - 0.40 10*3/mm3    Basophils, Absolute 0.03 0.00 - 0.20 10*3/mm3    Immature Grans, Absolute 0.09 (H) 0.00 - 0.05 10*3/mm3    nRBC 0.0 0.0 - 0.2 /100 WBC   Urinalysis, Microscopic Only - Urine, Clean Catch    Specimen: Urine, Clean Catch   Result Value Ref Range    RBC, UA Too Numerous to Count (A) None Seen, 0-2 /HPF    WBC, UA 6-10 (A) None Seen, 0-2 /HPF    Bacteria, UA 4+ (A) None Seen /HPF    Squamous Epithelial Cells, UA 0-2 None Seen, 0-2 /HPF    Hyaline Casts, UA None Seen None Seen /LPF    Methodology Automated Microscopy    Lactic Acid, Plasma    Specimen: Arm, Right; Blood   Result Value Ref Range     Lactate 1.9 0.5 - 2.0 mmol/L   Green Top (Gel)   Result Value Ref Range    Extra Tube Hold for add-ons.    Lavender Top   Result Value Ref Range    Extra Tube hold for add-on    Gold Top - SST   Result Value Ref Range    Extra Tube Hold for add-ons.    Light Blue Top   Result Value Ref Range    Extra Tube Hold for add-ons.           ED Course  ED Course as of 11/03/23 1206   Fri Nov 03, 2023   1101 CT Abdomen Pelvis With & Without Contrast  IMPRESSION:  1.  Mild soft tissue edema surrounding the renal pelves and ureters with  very mild hydronephrosis noted bilaterally. No obstructing stones.  2.  Urinary bladder wall thickening with surrounding urinary bladder  wall edema.  3.  Combination of above findings most consistent with infectious  cystitis with involvement of the renal collecting systems and ureters.  4.  No CT characteristic findings of pyelonephritis.  5.  Diffuse fatty liver.  6.  Stable small reactive appearing retroperitoneal lymph nodes. No  bulky adenopathy.  7.  Mild distal esophageal wall thickening. Commonly reflux esophagitis.  8.  Other incidental/nonacute findings above.   [AH]      ED Course User Index  [AH] Makenzie Wen, PA                                           Medical Decision Making  48-year-old female who presents to the ED today for hematuria as well as suprapubic and lower back pain.  The patient was found to have a UTI with hematuria.  She had a CT scan which showed no obstructing stones.  She did have very mild hydronephrosis bilaterally with urinary bladder wall thickening and surrounding urinary bladder wall edema.  This was most likely consistent with infectious cystitis with involvement of the renal collecting system and ureters.  Patient received IV fluids as well as pain and nausea medication.  Her blood pressure was also addressed with her daily medications.  She was feeling better at the time of discharge.  She did receive a dose of IV Rocephin.  She will be able to be  discharged home on cefdinir and Pyridium.  She was advised to follow closely with her primary care provider and to return to the ED at any time if symptoms change or worsen.    Problems Addressed:  Hemorrhagic cystitis: complicated acute illness or injury  Pyelonephritis: complicated acute illness or injury    Amount and/or Complexity of Data Reviewed  Labs: ordered.  Radiology: ordered. Decision-making details documented in ED Course.    Risk  Prescription drug management.        Final diagnoses:   Hemorrhagic cystitis   Pyelonephritis       ED Disposition  ED Disposition       ED Disposition   Discharge    Condition   Stable    Comment   --               Nat Pascual  [address] [facility] [id number] [phone]    Schedule an appointment as soon as possible for a visit in 3 days           Medication List        New Prescriptions      cefdinir 300 MG capsule  Commonly known as: OMNICEF  Take 1 capsule by mouth 2 (Two) Times a Day for 7 days.     phenazopyridine 200 MG tablet  Commonly known as: PYRIDIUM  Take 1 tablet by mouth 3 (Three) Times a Day As Needed for Bladder Spasms.               Where to Get Your Medications        These medications were sent to [facility] [id number] - [place] - [address] [place] [facility] [address] - [phone]  - [phone]   [address] [place] [address]      Phone: [phone]   cefdinir 300 MG capsule  phenazopyridine 200 MG tablet            Makenzie Wen PA  11/03/23 [id number]

## 2023-11-05 ENCOUNTER — HOSPITAL ENCOUNTER (EMERGENCY)
Facility: HOSPITAL | Age: 48
Discharge: HOME | End: 2023-11-05
Attending: EMERGENCY MEDICINE | Admitting: EMERGENCY MEDICINE
Payer: MEDICAID

## 2023-11-05 ENCOUNTER — APPOINTMENT (OUTPATIENT)
Dept: CT IMAGING | Facility: HOSPITAL | Age: 48
End: 2023-11-05
Payer: MEDICAID

## 2023-11-05 VITALS
WEIGHT: 223 LBS | TEMPERATURE: 98 F | SYSTOLIC BLOOD PRESSURE: 159 MMHG | HEIGHT: 65 IN | BODY MASS INDEX: 37.15 KG/M2 | RESPIRATION RATE: 18 BRPM | DIASTOLIC BLOOD PRESSURE: 93 MMHG | HEART RATE: 74 BPM | OXYGEN SATURATION: 90 %

## 2023-11-05 DIAGNOSIS — N39.0 ACUTE UTI: Primary | ICD-10-CM

## 2023-11-05 LAB
ALBUMIN SERPL-MCNC: 3.9 G/DL (ref 3.5–5.2)
ALBUMIN/GLOB SERPL: 1.2 G/DL
ALP SERPL-CCNC: 67 U/L (ref 39–117)
ALT SERPL W P-5'-P-CCNC: 11 U/L (ref 1–33)
ANION GAP SERPL CALCULATED.3IONS-SCNC: 10.2 MMOL/L (ref 5–15)
AST SERPL-CCNC: 17 U/L (ref 1–32)
BACTERIA SPEC AEROBE CULT: ABNORMAL
BACTERIA UR QL AUTO: ABNORMAL /HPF
BASOPHILS # BLD AUTO: 0.03 10*3/MM3 (ref 0–0.2)
BASOPHILS NFR BLD AUTO: 0.4 % (ref 0–1.5)
BILIRUB SERPL-MCNC: 0.4 MG/DL (ref 0–1.2)
BILIRUB UR QL STRIP: NEGATIVE
BUN SERPL-MCNC: 16 MG/DL (ref 6–20)
BUN/CREAT SERPL: 22.5 (ref 7–25)
CALCIUM SPEC-SCNC: 9.3 MG/DL (ref 8.6–10.5)
CHLORIDE SERPL-SCNC: 97 MMOL/L (ref 98–107)
CLARITY UR: CLEAR
CO2 SERPL-SCNC: 25.8 MMOL/L (ref 22–29)
COLOR UR: ABNORMAL
CREAT SERPL-MCNC: 0.71 MG/DL (ref 0.57–1)
CRP SERPL-MCNC: 6.39 MG/DL (ref 0–0.5)
D-LACTATE SERPL-SCNC: 1.6 MMOL/L (ref 0.5–2)
DEPRECATED RDW RBC AUTO: 37 FL (ref 37–54)
EGFRCR SERPLBLD CKD-EPI 2021: 105.7 ML/MIN/1.73
EOSINOPHIL # BLD AUTO: 0.17 10*3/MM3 (ref 0–0.4)
EOSINOPHIL NFR BLD AUTO: 2.2 % (ref 0.3–6.2)
ERYTHROCYTE [DISTWIDTH] IN BLOOD BY AUTOMATED COUNT: 12.1 % (ref 12.3–15.4)
GLOBULIN UR ELPH-MCNC: 3.2 GM/DL
GLUCOSE SERPL-MCNC: 341 MG/DL (ref 65–99)
GLUCOSE UR STRIP-MCNC: ABNORMAL MG/DL
HCT VFR BLD AUTO: 40.2 % (ref 34–46.6)
HGB BLD-MCNC: 13.1 G/DL (ref 12–15.9)
HGB UR QL STRIP.AUTO: ABNORMAL
HOLD SPECIMEN: NORMAL
HOLD SPECIMEN: NORMAL
HYALINE CASTS UR QL AUTO: ABNORMAL /LPF
IMM GRANULOCYTES # BLD AUTO: 0.04 10*3/MM3 (ref 0–0.05)
IMM GRANULOCYTES NFR BLD AUTO: 0.5 % (ref 0–0.5)
KETONES UR QL STRIP: NEGATIVE
LEUKOCYTE ESTERASE UR QL STRIP.AUTO: ABNORMAL
LYMPHOCYTES # BLD AUTO: 2.59 10*3/MM3 (ref 0.7–3.1)
LYMPHOCYTES NFR BLD AUTO: 34.3 % (ref 19.6–45.3)
MCH RBC QN AUTO: 27.5 PG (ref 26.6–33)
MCHC RBC AUTO-ENTMCNC: 32.6 G/DL (ref 31.5–35.7)
MCV RBC AUTO: 84.5 FL (ref 79–97)
MONOCYTES # BLD AUTO: 0.62 10*3/MM3 (ref 0.1–0.9)
MONOCYTES NFR BLD AUTO: 8.2 % (ref 5–12)
NEUTROPHILS NFR BLD AUTO: 4.11 10*3/MM3 (ref 1.7–7)
NEUTROPHILS NFR BLD AUTO: 54.4 % (ref 42.7–76)
NITRITE UR QL STRIP: POSITIVE
NRBC BLD AUTO-RTO: 0 /100 WBC (ref 0–0.2)
PH UR STRIP.AUTO: 5.5 [PH] (ref 5–8)
PLATELET # BLD AUTO: 175 10*3/MM3 (ref 140–450)
PMV BLD AUTO: 10.9 FL (ref 6–12)
POTASSIUM SERPL-SCNC: 4.4 MMOL/L (ref 3.5–5.2)
PROCALCITONIN SERPL-MCNC: 0.04 NG/ML (ref 0–0.25)
PROT SERPL-MCNC: 7.1 G/DL (ref 6–8.5)
PROT UR QL STRIP: ABNORMAL
RBC # BLD AUTO: 4.76 10*6/MM3 (ref 3.77–5.28)
RBC # UR STRIP: ABNORMAL /HPF
REF LAB TEST METHOD: ABNORMAL
SODIUM SERPL-SCNC: 133 MMOL/L (ref 136–145)
SP GR UR STRIP: 1.03 (ref 1–1.03)
SQUAMOUS #/AREA URNS HPF: ABNORMAL /HPF
UROBILINOGEN UR QL STRIP: ABNORMAL
WBC # UR STRIP: ABNORMAL /HPF
WBC NRBC COR # BLD: 7.56 10*3/MM3 (ref 3.4–10.8)
WHOLE BLOOD HOLD COAG: NORMAL
WHOLE BLOOD HOLD SPECIMEN: NORMAL
YEAST URNS QL MICRO: ABNORMAL /HPF

## 2023-11-05 PROCEDURE — 25010000002 CEFTRIAXONE PER 250 MG: Performed by: NURSE PRACTITIONER

## 2023-11-05 PROCEDURE — 25010000002 PROCHLORPERAZINE 10 MG/2ML SOLUTION: Performed by: NURSE PRACTITIONER

## 2023-11-05 PROCEDURE — 36415 COLL VENOUS BLD VENIPUNCTURE: CPT

## 2023-11-05 PROCEDURE — 87086 URINE CULTURE/COLONY COUNT: CPT | Performed by: NURSE PRACTITIONER

## 2023-11-05 PROCEDURE — 86140 C-REACTIVE PROTEIN: CPT | Performed by: NURSE PRACTITIONER

## 2023-11-05 PROCEDURE — 25510000001 IOPAMIDOL 61 % SOLUTION: Performed by: EMERGENCY MEDICINE

## 2023-11-05 PROCEDURE — 84145 PROCALCITONIN (PCT): CPT | Performed by: NURSE PRACTITIONER

## 2023-11-05 PROCEDURE — 25810000003 SODIUM CHLORIDE 0.9 % SOLUTION: Performed by: NURSE PRACTITIONER

## 2023-11-05 PROCEDURE — 25010000002 HYDROMORPHONE PER 4 MG: Performed by: NURSE PRACTITIONER

## 2023-11-05 PROCEDURE — 87040 BLOOD CULTURE FOR BACTERIA: CPT | Performed by: NURSE PRACTITIONER

## 2023-11-05 PROCEDURE — 85025 COMPLETE CBC W/AUTO DIFF WBC: CPT | Performed by: NURSE PRACTITIONER

## 2023-11-05 PROCEDURE — 96365 THER/PROPH/DIAG IV INF INIT: CPT

## 2023-11-05 PROCEDURE — 83605 ASSAY OF LACTIC ACID: CPT | Performed by: NURSE PRACTITIONER

## 2023-11-05 PROCEDURE — 80053 COMPREHEN METABOLIC PANEL: CPT | Performed by: NURSE PRACTITIONER

## 2023-11-05 PROCEDURE — 63710000001 ONDANSETRON ODT 4 MG TABLET DISPERSIBLE

## 2023-11-05 PROCEDURE — 96375 TX/PRO/DX INJ NEW DRUG ADDON: CPT

## 2023-11-05 PROCEDURE — 74177 CT ABD & PELVIS W/CONTRAST: CPT | Performed by: RADIOLOGY

## 2023-11-05 PROCEDURE — 81001 URINALYSIS AUTO W/SCOPE: CPT | Performed by: NURSE PRACTITIONER

## 2023-11-05 PROCEDURE — 99285 EMERGENCY DEPT VISIT HI MDM: CPT

## 2023-11-05 PROCEDURE — 74177 CT ABD & PELVIS W/CONTRAST: CPT

## 2023-11-05 RX ORDER — SODIUM CHLORIDE 0.9 % (FLUSH) 0.9 %
10 SYRINGE (ML) INJECTION AS NEEDED
Status: DISCONTINUED | OUTPATIENT
Start: 2023-11-05 | End: 2023-11-05 | Stop reason: HOSPADM

## 2023-11-05 RX ORDER — ONDANSETRON 4 MG/1
4 TABLET, ORALLY DISINTEGRATING ORAL ONCE
Status: COMPLETED | OUTPATIENT
Start: 2023-11-05 | End: 2023-11-05

## 2023-11-05 RX ORDER — HYDROMORPHONE HYDROCHLORIDE 1 MG/ML
0.5 INJECTION, SOLUTION INTRAMUSCULAR; INTRAVENOUS; SUBCUTANEOUS ONCE
Status: COMPLETED | OUTPATIENT
Start: 2023-11-05 | End: 2023-11-05

## 2023-11-05 RX ORDER — ONDANSETRON 4 MG/1
TABLET, ORALLY DISINTEGRATING ORAL
Status: COMPLETED
Start: 2023-11-05 | End: 2023-11-05

## 2023-11-05 RX ORDER — PROCHLORPERAZINE EDISYLATE 5 MG/ML
10 INJECTION INTRAMUSCULAR; INTRAVENOUS ONCE
Status: COMPLETED | OUTPATIENT
Start: 2023-11-05 | End: 2023-11-05

## 2023-11-05 RX ADMIN — ONDANSETRON: 4 TABLET, ORALLY DISINTEGRATING ORAL at 01:36

## 2023-11-05 RX ADMIN — SODIUM CHLORIDE 1000 ML: 9 INJECTION, SOLUTION INTRAVENOUS at 05:28

## 2023-11-05 RX ADMIN — PROCHLORPERAZINE EDISYLATE 10 MG: 5 INJECTION INTRAMUSCULAR; INTRAVENOUS at 03:28

## 2023-11-05 RX ADMIN — IOPAMIDOL 80 ML: 612 INJECTION, SOLUTION INTRAVENOUS at 03:44

## 2023-11-05 RX ADMIN — HYDROMORPHONE HYDROCHLORIDE 0.5 MG: 1 INJECTION, SOLUTION INTRAMUSCULAR; INTRAVENOUS; SUBCUTANEOUS at 03:28

## 2023-11-05 RX ADMIN — CEFTRIAXONE 2000 MG: 2 INJECTION, POWDER, FOR SOLUTION INTRAMUSCULAR; INTRAVENOUS at 05:28

## 2023-11-05 NOTE — ED PROVIDER NOTES
Subjective   History of Present Illness  Patient is a 48-year-old female with past medical history significant for diabetes mellitus, fibromyalgia, hypertension, depression and anxiety.  She presents to the ED today with complaints of flank pain, low back pain, and dysuria.  She was seen here recently and discharged with cefdinir for UTI.  Her urine culture grew out gram-negative bacilli.  She reports that tonight she has been vomiting and unable to keep down any medications.  She reports her back pain has gotten significantly worse.  She denies any other significant complaints.        Review of Systems   Constitutional: Negative.  Negative for fever.   HENT: Negative.     Eyes: Negative.    Respiratory: Negative.     Cardiovascular: Negative.  Negative for chest pain.   Gastrointestinal:  Positive for abdominal pain, nausea and vomiting.   Endocrine: Negative.    Genitourinary:  Positive for dysuria and flank pain.   Musculoskeletal:  Positive for back pain.   Skin: Negative.    Allergic/Immunologic: Negative.    Neurological: Negative.    Psychiatric/Behavioral: Negative.     All other systems reviewed and are negative.      Past Medical History:   Diagnosis Date    Depression     Diabetes mellitus     Fibromyalgia     Hypertension     Uterine cancer            No Known Allergies    Past Surgical History:   Procedure Laterality Date    ABDOMINAL SURGERY      BREAST BIOPSY Left 2014     SECTION      CHOLECYSTECTOMY      HYSTERECTOMY  2008       Family History   Problem Relation Age of Onset    No Known Problems Mother     No Known Problems Father     No Known Problems Sister     No Known Problems Brother     No Known Problems Son     No Known Problems Daughter     No Known Problems Maternal Grandmother     No Known Problems Maternal Grandfather     No Known Problems Paternal Grandmother     No Known Problems Paternal Grandfather     No Known Problems Cousin     Breast cancer Maternal Aunt     Rheum  arthritis Neg Hx     Osteoarthritis Neg Hx     Asthma Neg Hx     Diabetes Neg Hx     Heart failure Neg Hx     Hyperlipidemia Neg Hx     Hypertension Neg Hx     Migraines Neg Hx     Rashes / Skin problems Neg Hx     Seizures Neg Hx     Stroke Neg Hx     Thyroid disease Neg Hx        Social History     Socioeconomic History    Marital status:    Tobacco Use    Smoking status: Never    Smokeless tobacco: Never   Vaping Use    Vaping Use: Never used   Substance and Sexual Activity    Alcohol use: No    Drug use: No    Sexual activity: Defer           Objective   Physical Exam  Vitals and nursing note reviewed.   Constitutional:       General: She is not in acute distress.     Appearance: She is well-developed. She is not diaphoretic.   HENT:      Head: Normocephalic and atraumatic.      Right Ear: External ear normal.      Left Ear: External ear normal.      Nose: Nose normal.   Eyes:      Conjunctiva/sclera: Conjunctivae normal.      Pupils: Pupils are equal, round, and reactive to light.   Neck:      Vascular: No JVD.      Trachea: No tracheal deviation.   Cardiovascular:      Rate and Rhythm: Normal rate and regular rhythm.      Heart sounds: Normal heart sounds. No murmur heard.  Pulmonary:      Effort: Pulmonary effort is normal. No respiratory distress.      Breath sounds: Normal breath sounds. No wheezing.   Abdominal:      General: Bowel sounds are normal.      Palpations: Abdomen is soft.      Tenderness: There is no abdominal tenderness. There is right CVA tenderness and left CVA tenderness.   Musculoskeletal:         General: No deformity. Normal range of motion.      Cervical back: Normal range of motion and neck supple.   Skin:     General: Skin is warm and dry.      Capillary Refill: Capillary refill takes less than 2 seconds.      Coloration: Skin is not pale.      Findings: No erythema or rash.   Neurological:      General: No focal deficit present.      Mental Status: She is alert and oriented to  person, place, and time.      Cranial Nerves: No cranial nerve deficit.   Psychiatric:         Behavior: Behavior normal.         Thought Content: Thought content normal.         Procedures       Results for orders placed or performed during the hospital encounter of 11/05/23   Comprehensive Metabolic Panel    Specimen: Arm, Right; Blood   Result Value Ref Range    Glucose 341 (H) 65 - 99 mg/dL    BUN 16 6 - 20 mg/dL    Creatinine 0.71 0.57 - 1.00 mg/dL    Sodium 133 (L) 136 - 145 mmol/L    Potassium 4.4 3.5 - 5.2 mmol/L    Chloride 97 (L) 98 - 107 mmol/L    CO2 25.8 22.0 - 29.0 mmol/L    Calcium 9.3 8.6 - 10.5 mg/dL    Total Protein 7.1 6.0 - 8.5 g/dL    Albumin 3.9 3.5 - 5.2 g/dL    ALT (SGPT) 11 1 - 33 U/L    AST (SGOT) 17 1 - 32 U/L    Alkaline Phosphatase 67 39 - 117 U/L    Total Bilirubin 0.4 0.0 - 1.2 mg/dL    Globulin 3.2 gm/dL    A/G Ratio 1.2 g/dL    BUN/Creatinine Ratio 22.5 7.0 - 25.0    Anion Gap 10.2 5.0 - 15.0 mmol/L    eGFR 105.7 >60.0 mL/min/1.73   Urinalysis With Culture If Indicated - Urine, Clean Catch    Specimen: Urine, Clean Catch   Result Value Ref Range    Color, UA Orange (A) Yellow, Straw    Appearance, UA Clear Clear    pH, UA 5.5 5.0 - 8.0    Specific Gravity, UA 1.029 1.005 - 1.030    Glucose, UA >=1000 mg/dL (3+) (A) Negative    Ketones, UA Negative Negative    Bilirubin, UA Negative Negative    Blood, UA Moderate (2+) (A) Negative    Protein, UA 30 mg/dL (1+) (A) Negative    Leuk Esterase, UA Trace (A) Negative    Nitrite, UA Positive (A) Negative    Urobilinogen, UA 1.0 E.U./dL 0.2 - 1.0 E.U./dL   C-reactive Protein    Specimen: Arm, Right; Blood   Result Value Ref Range    C-Reactive Protein 6.39 (H) 0.00 - 0.50 mg/dL   CBC Auto Differential    Specimen: Arm, Right; Blood   Result Value Ref Range    WBC 7.56 3.40 - 10.80 10*3/mm3    RBC 4.76 3.77 - 5.28 10*6/mm3    Hemoglobin 13.1 12.0 - 15.9 g/dL    Hematocrit 40.2 34.0 - 46.6 %    MCV 84.5 79.0 - 97.0 fL    MCH 27.5 26.6 - 33.0 pg     MCHC 32.6 31.5 - 35.7 g/dL    RDW 12.1 (L) 12.3 - 15.4 %    RDW-SD 37.0 37.0 - 54.0 fl    MPV 10.9 6.0 - 12.0 fL    Platelets 175 140 - 450 10*3/mm3    Neutrophil % 54.4 42.7 - 76.0 %    Lymphocyte % 34.3 19.6 - 45.3 %    Monocyte % 8.2 5.0 - 12.0 %    Eosinophil % 2.2 0.3 - 6.2 %    Basophil % 0.4 0.0 - 1.5 %    Immature Grans % 0.5 0.0 - 0.5 %    Neutrophils, Absolute 4.11 1.70 - 7.00 10*3/mm3    Lymphocytes, Absolute 2.59 0.70 - 3.10 10*3/mm3    Monocytes, Absolute 0.62 0.10 - 0.90 10*3/mm3    Eosinophils, Absolute 0.17 0.00 - 0.40 10*3/mm3    Basophils, Absolute 0.03 0.00 - 0.20 10*3/mm3    Immature Grans, Absolute 0.04 0.00 - 0.05 10*3/mm3    nRBC 0.0 0.0 - 0.2 /100 WBC   Lactic Acid, Plasma    Specimen: Arm, Right; Blood   Result Value Ref Range    Lactate 1.6 0.5 - 2.0 mmol/L   Procalcitonin    Specimen: Arm, Right; Blood   Result Value Ref Range    Procalcitonin 0.04 0.00 - 0.25 ng/mL   Urinalysis, Microscopic Only - Urine, Clean Catch    Specimen: Urine, Clean Catch   Result Value Ref Range    RBC, UA 11-20 (A) None Seen, 0-2 /HPF    WBC, UA 6-10 (A) None Seen, 0-2 /HPF    Bacteria, UA Trace (A) None Seen /HPF    Squamous Epithelial Cells, UA 3-6 (A) None Seen, 0-2 /HPF    Yeast, UA Small/1+ Yeast None Seen /HPF    Hyaline Casts, UA None Seen None Seen /LPF    Methodology Automated Microscopy    Green Top (Gel)   Result Value Ref Range    Extra Tube Hold for add-ons.    Lavender Top   Result Value Ref Range    Extra Tube hold for add-on    Gold Top - SST   Result Value Ref Range    Extra Tube Hold for add-ons.    Light Blue Top   Result Value Ref Range    Extra Tube Hold for add-ons.       CT Abdomen Pelvis With Contrast   Final Result       1.  Lap band device in place.   2.  Constipation involving the right colon and transverse colon segment.   3.  Cholecystectomy.   4.  Normal appendix.   5.  Possible mild enteritis and mesenteric adenitis.   6.  No features of pyelonephritis.   7.  Hysterectomy.   8.   No bowel or renal obstruction.   9.  No free fluid or free air.   10.  No abscess or hematoma.       This report was finalized on 11/5/2023 5:12 AM by Bo Finney MD.               ED Course  ED Course as of 11/05/23 0643   Sun Nov 05, 2023   0540 Patient is feeling better.  Pain has significantly improved.  She has not had any more vomiting.  Discussed with her labs and CT results.  Discharge home and continue the cefdinir.  Follow-up with PCP.  Return to the ED with any worsening. [MB]      ED Course User Index  [MB] Elizabeth Pascual APRN                                           Medical Decision Making  Problems Addressed:  Acute UTI: complicated acute illness or injury    Amount and/or Complexity of Data Reviewed  Labs: ordered.  Radiology: ordered.    Risk  Prescription drug management.        Final diagnoses:   Acute UTI       ED Disposition  ED Disposition       ED Disposition   Discharge    Condition   Stable    Comment   --               Nat Pascual  [address] [place], [place] [address]  [phone]    Call in 2 days           Medication List      No changes were made to your prescriptions during this visit.            Elizabeth Pascual APRN  11/05/23 0643

## 2023-11-06 LAB — BACTERIA SPEC AEROBE CULT: NO GROWTH

## 2023-11-08 LAB
BACTERIA SPEC AEROBE CULT: NORMAL
BACTERIA SPEC AEROBE CULT: NORMAL

## 2023-11-10 LAB
BACTERIA SPEC AEROBE CULT: NORMAL
BACTERIA SPEC AEROBE CULT: NORMAL

## 2023-12-27 ENCOUNTER — OFFICE VISIT (OUTPATIENT)
Dept: ENDOCRINOLOGY | Facility: CLINIC | Age: 48
End: 2023-12-27
Payer: COMMERCIAL

## 2023-12-27 ENCOUNTER — SPECIALTY PHARMACY (OUTPATIENT)
Dept: PHARMACY | Facility: HOSPITAL | Age: 48
End: 2023-12-27
Payer: COMMERCIAL

## 2023-12-27 VITALS
HEIGHT: 65 IN | DIASTOLIC BLOOD PRESSURE: 90 MMHG | WEIGHT: 228.2 LBS | HEART RATE: 75 BPM | SYSTOLIC BLOOD PRESSURE: 158 MMHG | BODY MASS INDEX: 38.02 KG/M2 | OXYGEN SATURATION: 98 %

## 2023-12-27 DIAGNOSIS — E11.65 TYPE 2 DIABETES MELLITUS WITH HYPERGLYCEMIA, WITH LONG-TERM CURRENT USE OF INSULIN: Primary | ICD-10-CM

## 2023-12-27 DIAGNOSIS — Z79.4 TYPE 2 DIABETES MELLITUS WITH HYPERGLYCEMIA, WITH LONG-TERM CURRENT USE OF INSULIN: Primary | ICD-10-CM

## 2023-12-27 LAB — GLUCOSE BLDC GLUCOMTR-MCNC: 444 MG/DL (ref 70–130)

## 2023-12-27 RX ORDER — INSULIN DEGLUDEC INJECTION 100 U/ML
35 INJECTION, SOLUTION SUBCUTANEOUS NIGHTLY
Qty: 15 ML | Refills: 4 | Status: SHIPPED | OUTPATIENT
Start: 2023-12-27 | End: 2023-12-27

## 2023-12-27 RX ORDER — INSULIN DETEMIR 100 [IU]/ML
35 INJECTION, SOLUTION SUBCUTANEOUS DAILY
Qty: 15 ML | Refills: 4 | Status: SHIPPED | OUTPATIENT
Start: 2023-12-27

## 2023-12-27 RX ORDER — BLOOD-GLUCOSE SENSOR
1 EACH MISCELLANEOUS
Qty: 2 EACH | Refills: 5 | Status: SHIPPED | OUTPATIENT
Start: 2023-12-27

## 2023-12-27 RX ORDER — BLOOD-GLUCOSE,RECEIVER,CONT
1 EACH MISCELLANEOUS TAKE AS DIRECTED
Qty: 1 EACH | Refills: 0 | Status: SHIPPED | OUTPATIENT
Start: 2023-12-27

## 2023-12-27 RX ORDER — INSULIN LISPRO 100 [IU]/ML
25 INJECTION, SOLUTION INTRAVENOUS; SUBCUTANEOUS 3 TIMES DAILY
Qty: 30 ML | Refills: 4 | Status: SHIPPED | OUTPATIENT
Start: 2023-12-27

## 2023-12-27 NOTE — PATIENT INSTRUCTIONS
Tresiba 20 units at night, increase by 2 units every 3 days, until fasting blood sugars are around 120.  Humalog 12 units with meals., post meal blood sugars around 180.  Use sensor.

## 2023-12-27 NOTE — PROGRESS NOTES
Chief Complaint   Patient presents with    Diabetes        Janet Hurtado is a 48 y.o. female had concerns including Diabetes.    T2DM.    Has not had any medication in the last 12 months due to loss of insurance and not coming to her appts for medication adjustment. She has not been checking her BG's as she has not had a sensor and does not perform finger sticks.    Diabetes:  Diabetes was diagnosed 10 years ago.  Complications include pain/tingling/burning in her legs, ankles, feet and fingertips, had pressure in her eyes at her most recent eye exam, has seen nephrology and all was well.  Last ophtho exam was  [facility].  Current medications for diabetes include none.  Previous meds: Trulicity-GI Issues, Ozempic-GI Issues, SGLT-2-yeast infections, Metformin-GI Issues, glipizide-stopped when started insulin  She checks her blood sugar never.  Hypos: never    ACE/ARB:yes, Statin: No  Labs:  A1C: 2022: 11.9, 12 (10/2023)  Lipid Panel: Up-to-date  ZANA: Needs done    The following portions of the patient's history were reviewed and updated as appropriate: allergies, current medications, past family history, past medical history, past social history, past surgical history and problem list.    She states that her diet is not great at this moment.      Past Medical History:   Diagnosis Date    Depression     Diabetes mellitus     Fibromyalgia     Hypertension     Uterine cancer          Past Surgical History:   Procedure Laterality Date    ABDOMINAL SURGERY      BREAST BIOPSY Left 2014     SECTION      CHOLECYSTECTOMY      HYSTERECTOMY  2008      Family History   Problem Relation Age of Onset    No Known Problems Mother     No Known Problems Father     No Known Problems Sister     No Known Problems Brother     No Known Problems Son     No Known Problems Daughter     No Known Problems Maternal Grandmother     No Known Problems Maternal Grandfather     No Known Problems Paternal Grandmother      No Known Problems Paternal Grandfather     No Known Problems Cousin     Breast cancer Maternal Aunt     Rheum arthritis Neg Hx     Osteoarthritis Neg Hx     Asthma Neg Hx     Diabetes Neg Hx     Heart failure Neg Hx     Hyperlipidemia Neg Hx     Hypertension Neg Hx     Migraines Neg Hx     Rashes / Skin problems Neg Hx     Seizures Neg Hx     Stroke Neg Hx     Thyroid disease Neg Hx       Social History     Socioeconomic History    Marital status:    Tobacco Use    Smoking status: Never    Smokeless tobacco: Never   Vaping Use    Vaping Use: Never used   Substance and Sexual Activity    Alcohol use: No    Drug use: No    Sexual activity: Defer      No Known Allergies   Current Outpatient Medications on File Prior to Visit   Medication Sig Dispense Refill    cloNIDine (CATAPRES) 0.1 MG tablet Take 1 tablet by mouth 2 (Two) Times a Day As Needed for High Blood Pressure.      cyclobenzaprine (FLEXERIL) 10 MG tablet Take 1 tablet by mouth 3 (Three) Times a Day As Needed for Muscle Spasms.      ergocalciferol (ERGOCALCIFEROL) 1.25 MG (82795 UT) capsule Take 1 capsule by mouth 1 (One) Time Per Week. (Patient not taking: Reported on 12/27/2023)      fluticasone (FLONASE) 50 MCG/ACT nasal spray 1-2 sprays into the nostril(s) as directed by provider As Needed.      gabapentin (NEURONTIN) 300 MG capsule Take 1 capsule by mouth. 3 capsules at night and an additional 1-2 capsule during the day if needed      hydrOXYzine (ATARAX) 10 MG tablet Take 1 tablet by mouth 3 (Three) Times a Day As Needed for Anxiety.      lisinopril (PRINIVIL,ZESTRIL) 20 MG tablet Take 1 tablet by mouth Daily.      ondansetron ODT (ZOFRAN-ODT) 4 MG disintegrating tablet Take 1 tablet by mouth Every 6 (Six) Hours As Needed for nausea or vomiting. 10 tablet 0    [DISCONTINUED] Continuous Blood Gluc Sensor (FreeStyle Damon 2 Sensor) misc Use 1 sensor Every 14 (Fourteen) Days. 6 each 3    [DISCONTINUED] DULoxetine (CYMBALTA) 30 MG capsule Take 1  capsule by mouth Daily. 30 capsule 1    [DISCONTINUED] ezetimibe (Zetia) 10 MG tablet Take 1 tablet by mouth Daily. 30 tablet 1    [DISCONTINUED] glucose blood test strip Use 3 times a day as directed 100 each 2    [DISCONTINUED] insulin degludec (Tresiba FlexTouch) 100 UNIT/ML solution pen-injector injection Inject 50 Units under the skin into the appropriate area as directed Every Night. Inject up to 50 units nightly 15 mL 5    [DISCONTINUED] Insulin Lispro, 1 Unit Dial, (HumaLOG KwikPen) 100 UNIT/ML solution pen-injector Inject 35 Units under the skin into the appropriate area as directed 3 (Three) Times a Day With Meals. 30 mL 5    [DISCONTINUED] Insulin Pen Needle (Pen Needles) 32G X 4 MM misc Use to inject insulin 4 times daily as directed. 100 each 5    [DISCONTINUED] Lancets (OneTouch Delica Plus [id number]) misc Use 1 each 3 times a day. 100 each 3    [DISCONTINUED] meloxicam (MOBIC) 7.5 MG tablet Take 7.5 mg by mouth Daily As Needed.      [DISCONTINUED] methocarbamol (ROBAXIN) 500 MG tablet Take 500 mg by mouth 4 (Four) Times a Day As Needed for Muscle Spasms.      [DISCONTINUED] oxyCODONE-acetaminophen (PERCOCET) 7.5-325 MG per tablet Take 1 tablet by mouth Every 8 (Eight) Hours As Needed.      [DISCONTINUED] phenazopyridine (PYRIDIUM) 200 MG tablet Take 1 tablet by mouth 3 (Three) Times a Day As Needed for Bladder Spasms. 6 tablet 0    [DISCONTINUED] Semaglutide,0.25 or 0.5MG/DOS, (Ozempic, 0.25 or 0.5 MG/DOSE,) 2 MG/1.5ML solution pen-injector Inject 0.25 mg under the skin into the appropriate area as directed 1 (One) Time Per Week. 1.5 mL 3     No current facility-administered medications on file prior to visit.        Review of Systems   Constitutional:  Positive for activity change, appetite change, fatigue and unexpected weight loss.   Eyes:  Positive for blurred vision.   Gastrointestinal:  Positive for abdominal pain.   Endocrine: Positive for polydipsia and polyuria.   Neurological:  Positive for  "numbness.   Psychiatric/Behavioral:  Positive for sleep disturbance.    All other systems reviewed and are negative.    /90 (BP Location: Left arm, Patient Position: Sitting, Cuff Size: Adult)   Pulse 75   Ht 165.1 cm (65\")   Wt 104 kg (228 lb 3.2 oz)   LMP  (LMP Unknown)   SpO2 98%   BMI 37.97 kg/m²      Physical Exam  Vitals reviewed.   Constitutional:       Appearance: Normal appearance.   Eyes:      Extraocular Movements: Extraocular movements intact.   Cardiovascular:      Rate and Rhythm: Normal rate.      Pulses: Normal pulses.   Pulmonary:      Effort: Pulmonary effort is normal.   Skin:     General: Skin is warm.   Neurological:      Mental Status: She is alert and oriented to person, place, and time.   Psychiatric:         Mood and Affect: Mood normal.         Behavior: Behavior normal.         Thought Content: Thought content normal.         Judgment: Judgment normal.       CMP:  Lab Results   Component Value Date    BUN 16 11/05/2023    CREATININE 0.71 11/05/2023    EGFRIFNONA 106 07/21/2021    BCR 22.5 11/05/2023     (L) 11/05/2023    K 4.4 11/05/2023    CO2 25.8 11/05/2023    CALCIUM 9.3 11/05/2023    ALBUMIN 3.9 11/05/2023    BILITOT 0.4 11/05/2023    ALKPHOS 67 11/05/2023    AST 17 11/05/2023    ALT 11 11/05/2023     Lipid Panel:  No results found for: \"CHOL\", \"TRIG\", \"HDL\", \"VLDL\", \"LDL\"  HbA1c:  Lab Results   Component Value Date    HGBA1C 10.3 05/25/2022    HGBA1C 10.20 (H) 09/24/2018     Glucose:  Lab Results   Component Value Date    POCGLU 444 (A) 12/27/2023     Microalbumin:  No results found for: \"MALBCRERATIO\"  TSH:  Lab Results   Component Value Date    TSH 0.928 07/21/2021       Assessment and Plan    Diagnoses and all orders for this visit:    1. Type 2 diabetes mellitus with hyperglycemia, with long-term current use of insulin (Primary)  Assessment & Plan:  -Diabetes is worse with A1c 12.  -Discussed in length with the patient the dietary and exercise guidelines.  "   -Discussed importance of yearly eye exams.  She will keep the scheduled appointments.  -Discussed importance of checking BG's. Start wearing CGM.  -Discussed importance of notifying office if her insulin is running low or out of date.  Discussed the importance of taking her medication regularly and not missing doses to keep her blood sugars under control.  -Start Tresiba 20 units at night, increase by 2 units every 3 days, until fasting blood sugars are around 120.  -Start Humalog 12 units with meals., post meal blood sugars around 180.  -S/S hypoglycemia reviewed with Rule of 15's advised.  -Follow-up in 1 month.    Orders:  -     POC Glucose, Blood         Return in about 4 weeks (around 1/24/2024) for Follow-up appointment. The patient was instructed to contact the clinic with any interval questions or concerns.    This document has been electronically signed by HAVEN Montelongo  December 27, 2023 12:06 EST  Endocrinology

## 2023-12-27 NOTE — ASSESSMENT & PLAN NOTE
-Diabetes is worse with A1c 12.  -Discussed in length with the patient the dietary and exercise guidelines.    -Discussed importance of yearly eye exams.  She will keep the scheduled appointments.  -Discussed importance of checking BG's. Start wearing CGM.  -Discussed importance of notifying office if her insulin is running low or out of date.  Discussed the importance of taking her medication regularly and not missing doses to keep her blood sugars under control.  -Start Tresiba 20 units at night, increase by 2 units every 3 days, until fasting blood sugars are around 120.  -Start Humalog 12 units with meals., post meal blood sugars around 180.  -S/S hypoglycemia reviewed with Rule of 15's advised.  -Follow-up in 1 month.   Additional Notes: Patient consent was obtained to proceed with the visit and recommended plan of care after discussion of all risks and benefits, including the risks of COVID-19 exposure. Detail Level: Simple

## 2023-12-27 NOTE — PROGRESS NOTES
Specialty Pharmacy Patient Management Program  Endocrinology Reassessment     Janet Hurtado is a 48 y.o. female seen by an Endocrinology Provider for Type 2 Diabetes and enrolled in the Endocrinology Patient Management program offered by [facility].  A follow-up outreach was conducted, including assessment of continued therapy appropriateness, medication adherence, and side effect incidence and management.    Patient is not currently taking any medications for diabetes. She lost her insurance and didn't come back for follow-up appointments. It has been ~11 months since she has taken any medications for diabetes. She now has prescription coverage and presents to clinic today to resume therapy. She is not monitoring her BG at home.     Patient denies personal/family history of thyroid cancer, denies history of pancreatitis, and reports issues with recurrent yeast infections.     In the past, the patient has tried:     Drug Dose Reason for Discontinuation Notes   Ozempic  GI intolerance    Tresiba  Lost insurance    Humalog  Lost insurance                  Changes to Insurance Coverage or Financial Support  [place] Medicaid      Relevant Past Medical History and Comorbidities  Relevant medical history and concomitant health conditions were discussed with the patient. The patient's chart has been reviewed for relevant past medical history and comorbid health conditions and updated as necessary.   Past Medical History:   Diagnosis Date    Depression     Diabetes mellitus     Fibromyalgia     Hypertension     Uterine cancer     2008     Social History     Socioeconomic History    Marital status:    Tobacco Use    Smoking status: Never    Smokeless tobacco: Never   Vaping Use    Vaping Use: Never used   Substance and Sexual Activity    Alcohol use: No    Drug use: No    Sexual activity: Defer       Problem list reviewed by Marianela Duncan RPH on 12/27/2023 at 10:42 AM    Hospitalizations and  "Urgent Care Since Last Assessment  ED Visits, Admissions or Hospitalizations : 2 - urinary tract infection  Urgent Office Visits: None    Allergies  Known allergies and reactions were discussed with the patient. The patient's chart has been reviewed for allergy information and updated as necessary.   No Known Allergies    Allergies reviewed by Marianela Duncan RPH on 12/27/2023 at 10:41 AM    Relevant Laboratory Values    Lab Results   Component Value Date    HGBA1C 10.3 05/25/2022     Lab Results   Component Value Date    GLUCOSE 341 (H) 11/05/2023    CALCIUM 9.3 11/05/2023     (L) 11/05/2023    K 4.4 11/05/2023    CO2 25.8 11/05/2023    CL 97 (L) 11/05/2023    BUN 16 11/05/2023    CREATININE 0.71 11/05/2023    EGFRIFNONA 106 07/21/2021    BCR 22.5 11/05/2023    ANIONGAP 10.2 11/05/2023     No results found for: \"CHOL\", \"CHLPL\", \"TRIG\", \"HDL\", \"LDL\", \"LDLDIRECT\"      Current Medication List  This medication list has been reviewed with the patient and evaluated for any interactions or necessary modifications/recommendations, and updated to include all prescription medications, OTC medications, and supplements the patient is currently taking. This list reflects what is contained in the patient's profile, which has also been marked as reviewed to communicate to other providers it is the most up to date version of the patient's current medication therapy.     Current Outpatient Medications:     cloNIDine (CATAPRES) 0.1 MG tablet, Take 1 tablet by mouth 2 (Two) Times a Day As Needed for High Blood Pressure., Disp: , Rfl:     cyclobenzaprine (FLEXERIL) 10 MG tablet, Take 1 tablet by mouth 3 (Three) Times a Day As Needed for Muscle Spasms., Disp: , Rfl:     fluticasone (FLONASE) 50 MCG/ACT nasal spray, 1-2 sprays into the nostril(s) as directed by provider As Needed., Disp: , Rfl:     gabapentin (NEURONTIN) 300 MG capsule, Take 1 capsule by mouth. 3 capsules at night and an additional 1-2 capsule during the day if " needed, Disp: , Rfl:     hydrOXYzine (ATARAX) 10 MG tablet, Take 1 tablet by mouth 3 (Three) Times a Day As Needed for Anxiety., Disp: , Rfl:     lisinopril (PRINIVIL,ZESTRIL) 20 MG tablet, Take 1 tablet by mouth Daily., Disp: , Rfl:     ondansetron ODT (ZOFRAN-ODT) 4 MG disintegrating tablet, Take 1 tablet by mouth Every 6 (Six) Hours As Needed for nausea or vomiting., Disp: 10 tablet, Rfl: 0    ergocalciferol (ERGOCALCIFEROL) 1.25 MG (70188 UT) capsule, Take 1 capsule by mouth 1 (One) Time Per Week. (Patient not taking: Reported on 12/27/2023), Disp: , Rfl:     Medicines reviewed by Marianela Duncan [facility] on 12/27/2023 at 10:46 AM    Drug Interactions  No significant drug-drug interactions with diabetes medications expected according to literature.    Recommended Medications Assessment  Aspirin: Not Taking Currently  Statin: Not Taking Currently (reports that statins caused muscle cramping)  ACEi/ARB: Currently Taking     Adverse Drug Reactions  Adverse Reactions Experienced: None          Plan for ADR Management: None required     Adherence, Self-Administration, and Current Therapy Problems  Adherence related to the patient's specialty therapy was discussed with the patient.  New or Ongoing Barriers to Patient Adherence and/or Self-Administration: Insurance issues  Methods for Supporting Patient Adherence and/or Self-Administration: Prescription covereage has resumed.     Goals of Therapy  Goals related to the patient's specialty therapy was discussed with the patient. The Patient Goals segment of this outreach has been reviewed and updated.    Goals Addressed Today        HEMOGLOBIN A1C < 7              Reassessment Plan & Follow-Up  Patient's diabetes is uncontrolled with most recent A1C of 12%.  Medication Therapy Changes:   Start FreeStyle Damon 3  Start Humalog 12 units with meals and increase as directed to a goal blood sugar of 180 post meal.  Start Levemir 20 units at night and increase by 2 units every 3  days until fasting blood sugars are around 120.   Related Plans, Therapy Recommendations, or Issues to Be Addressed: Pt would like to use [facility]'s shipping services. Will enroll in specialty pharmacy     Pharmacist to perform regular reassessments no more than (6) months from the previous assessment.  Care Coordinator to set up future refill outreaches, coordinate prescription delivery, and escalate clinical questions to pharmacist.     Education Provided for DM medications:  The patient has been provided with the following education and any applicable administration techniques (i.e. self-injection) have been demonstrated for the therapies indicated. All questions and concerns have been addressed prior to the patient receiving the medication, and the patient has verbalized understanding of the education and any materials provided.  Additional patient education shall be provided and documented upon request by the patient, provider or payer.      FreeStyle Damon 3:  Educated patient on using Freestyle Damon 3 device to monitor BG:     Freestyle Damon 3 has two parts: a sensor and a reader, which is an ute on smart phone.   The smart phone can allow readings to be automatically sent to the clinic using our invitation code allowing us to review for your appointment.    Sensors:  Sensors come pre-assembled.  Sensors are placed on back of patient's upper arm. Do not apply to the stomach.  Educated on application process:   Clean area with alcohol beforehand.   Sensor placement is important and you will want to change your insertion site with each sensor. Using the same site too often might not allow the skin to heal, causing scarring or skin irritation.  Must be changed every 14 days. The ute will notify you when it's time to change the sensor. One month supply will include two sensors.  Choose a site:   At least 3 inches from any injection site  Away from scarring, tattoos, irritation, and bones  Unlikely to be  "bumped, pushed, or laid on while sleeping  To apply the sensor, press firmly and listen for the click. Pull back slowly after a few seconds.   Patient may purchase sensor covers if concerned about sensor falling off or use a band-aid.  Sensor goes into the subcutaneous (fatty) part of the skin. This can cause a real-time lag. If a finger stick is slightly off from your sensor reading, this is why.     Adali on Smart Phone:  Will be used to alert you when blood sugar is low or high and to obtain blood sugar readings. Low alerts are set for 70 and high alerts for 250.   To start a new sensor:  Open the Adali on smart phone and tap \"start new sensor\"  Scan the sensor by touching it with the top of your smart phone. You'll receive a tone and vibration when you've successfully started it.   When starting a new sensor, there is a 60 minute warm up period before you will get BG readings.  Adali with notify you when it is time to change sensor.     Attestation  I attest the patient was actively involved in and has agreed to the above plan of care. If the prescribed therapy is at any point deemed not appropriate based on the current or future assessments, a consultation will be initiated with the patient's specialty care provider to determine the best course of action. The revised plan of therapy will be documented along with any required assessments and/or additional patient education provided.    Marianela Duncan, PharmD, BERNADINE DORAN  Clinical Specialty Pharmacist, Endocrinology  12/27/2023  11:01 EST   " ---

## 2024-01-02 ENCOUNTER — PRIOR AUTHORIZATION (OUTPATIENT)
Dept: ENDOCRINOLOGY | Facility: CLINIC | Age: 49
End: 2024-01-02
Payer: COMMERCIAL

## 2024-01-02 NOTE — TELEPHONE ENCOUNTER
PA required for  xu 3 reader. PA was submitted and approved on 12-27-23.   [id number]  PA reference #     this request is approved from 12-27-23 to 12-26-24    Heidi Pete MA  01/02/24  15:32 EST

## 2024-01-10 RX ORDER — ERGOCALCIFEROL 1.25 MG/1
50000 CAPSULE ORAL WEEKLY
Qty: 5 CAPSULE | Refills: 4 | Status: SHIPPED | OUTPATIENT
Start: 2024-01-10

## 2024-01-18 ENCOUNTER — SPECIALTY PHARMACY (OUTPATIENT)
Dept: PHARMACY | Facility: HOSPITAL | Age: 49
End: 2024-01-18
Payer: COMMERCIAL

## 2024-01-29 ENCOUNTER — OFFICE VISIT (OUTPATIENT)
Dept: ENDOCRINOLOGY | Facility: CLINIC | Age: 49
End: 2024-01-29
Payer: COMMERCIAL

## 2024-01-29 VITALS
SYSTOLIC BLOOD PRESSURE: 140 MMHG | HEART RATE: 63 BPM | HEIGHT: 65 IN | DIASTOLIC BLOOD PRESSURE: 70 MMHG | WEIGHT: 228 LBS | OXYGEN SATURATION: 99 % | BODY MASS INDEX: 37.99 KG/M2

## 2024-01-29 DIAGNOSIS — Z79.4 TYPE 2 DIABETES MELLITUS WITH HYPERGLYCEMIA, WITH LONG-TERM CURRENT USE OF INSULIN: Primary | ICD-10-CM

## 2024-01-29 DIAGNOSIS — E11.65 TYPE 2 DIABETES MELLITUS WITH HYPERGLYCEMIA, WITH LONG-TERM CURRENT USE OF INSULIN: Primary | ICD-10-CM

## 2024-01-29 LAB
EXPIRATION DATE: ABNORMAL
GLUCOSE BLDC GLUCOMTR-MCNC: 310 MG/DL (ref 70–130)
HBA1C MFR BLD: 11.5 % (ref 4.5–5.7)
Lab: ABNORMAL

## 2024-01-29 PROCEDURE — 99214 OFFICE O/P EST MOD 30 MIN: CPT | Performed by: NURSE PRACTITIONER

## 2024-01-29 PROCEDURE — 3078F DIAST BP <80 MM HG: CPT | Performed by: NURSE PRACTITIONER

## 2024-01-29 PROCEDURE — 83036 HEMOGLOBIN GLYCOSYLATED A1C: CPT | Performed by: NURSE PRACTITIONER

## 2024-01-29 PROCEDURE — 3046F HEMOGLOBIN A1C LEVEL >9.0%: CPT | Performed by: NURSE PRACTITIONER

## 2024-01-29 PROCEDURE — 3077F SYST BP >= 140 MM HG: CPT | Performed by: NURSE PRACTITIONER

## 2024-01-29 PROCEDURE — 1159F MED LIST DOCD IN RCRD: CPT | Performed by: NURSE PRACTITIONER

## 2024-01-29 PROCEDURE — 82947 ASSAY GLUCOSE BLOOD QUANT: CPT | Performed by: NURSE PRACTITIONER

## 2024-01-29 PROCEDURE — 1160F RVW MEDS BY RX/DR IN RCRD: CPT | Performed by: NURSE PRACTITIONER

## 2024-01-29 RX ORDER — PROCHLORPERAZINE 25 MG/1
1 SUPPOSITORY RECTAL CONTINUOUS
Qty: 1 EACH | Refills: 0 | Status: SHIPPED | OUTPATIENT
Start: 2024-01-29

## 2024-01-29 RX ORDER — PROCHLORPERAZINE 25 MG/1
SUPPOSITORY RECTAL
Qty: 3 EACH | Refills: 11 | Status: SHIPPED | OUTPATIENT
Start: 2024-01-29

## 2024-01-29 RX ORDER — INSULIN PMP CART,AUT,G6/7,CNTR
1 EACH SUBCUTANEOUS TAKE AS DIRECTED
Qty: 1 KIT | Refills: 0 | Status: SHIPPED | OUTPATIENT
Start: 2024-01-29

## 2024-01-29 RX ORDER — PROCHLORPERAZINE 25 MG/1
1 SUPPOSITORY RECTAL
Qty: 1 EACH | Refills: 3 | Status: SHIPPED | OUTPATIENT
Start: 2024-01-29

## 2024-01-29 NOTE — PATIENT INSTRUCTIONS
Take Humalog 50 units before meals plus extra if your blood sugar is high.   If BG is 150-199: extra 3 unit  -249: extra 6 units  -299: extra 9 units  -349: extra 12 units  +: extra 15 units.      Lantus 40 units at night, if in 3 days, fastings still above 180, increase to 50 units at night.

## 2024-01-29 NOTE — PROGRESS NOTES
Chief Complaint   Patient presents with    Diabetes        Janet Hurtado is a 49 y.o. female had concerns including Diabetes.    T2DM.    She has been taking her medicine since her last visit.  She has not been missing doses.  She has been having to administer more doses often to help improve her BG's, but has noted that they are still elevated. She has been having to administer more Levemir and Humalog than prescribed due to her having more hyperglycemia.    Diabetes:  Diabetes was diagnosed 10 years ago.  Complications include pain/tingling/burning in her legs, ankles, feet and fingertips, had pressure in her eyes at her most recent eye exam, has seen nephrology and all was well.  [facility]  Current medications for diabetes include Levemir 30 units QHS, Humalog 30-40 units with meals 3-6 times per day.  Previous meds: Trulicity-GI Issues, Ozempic-GI Issues, SGLT-2-yeast infections, Metformin-GI Issues, glipizide-stopped when started insulin  She checks her blood sugar with a Damon CGM.  Hypos: never    ACE/ARB: yes, Statin: No  Labs:  A1C: 2022: 11.9, 12 (10/2023)  Lipid Panel: Up-to-date  ZANA: Needs done    The following portions of the patient's history were reviewed and updated as appropriate: allergies, current medications, past family history, past medical history, past social history, past surgical history and problem list.    She states that her diet is not great at this moment.      Past Medical History:   Diagnosis Date    Depression     Diabetes mellitus     Fibromyalgia     Hypertension     Uterine cancer          Past Surgical History:   Procedure Laterality Date    ABDOMINAL SURGERY      BREAST BIOPSY Left 2014     SECTION      CHOLECYSTECTOMY      HYSTERECTOMY  2008      Family History   Problem Relation Age of Onset    No Known Problems Mother     No Known Problems Father     No Known Problems Sister     No Known Problems Brother     No Known  Problems Son     No Known Problems Daughter     No Known Problems Maternal Grandmother     No Known Problems Maternal Grandfather     No Known Problems Paternal Grandmother     No Known Problems Paternal Grandfather     No Known Problems Cousin     Breast cancer Maternal Aunt     Rheum arthritis Neg Hx     Osteoarthritis Neg Hx     Asthma Neg Hx     Diabetes Neg Hx     Heart failure Neg Hx     Hyperlipidemia Neg Hx     Hypertension Neg Hx     Migraines Neg Hx     Rashes / Skin problems Neg Hx     Seizures Neg Hx     Stroke Neg Hx     Thyroid disease Neg Hx       Social History     Socioeconomic History    Marital status:    Tobacco Use    Smoking status: Never    Smokeless tobacco: Never   Vaping Use    Vaping Use: Never used   Substance and Sexual Activity    Alcohol use: No    Drug use: No    Sexual activity: Defer      No Known Allergies   Current Outpatient Medications on File Prior to Visit   Medication Sig Dispense Refill    cloNIDine (CATAPRES) 0.1 MG tablet Take 1 tablet by mouth 2 (Two) Times a Day As Needed for High Blood Pressure.      cyclobenzaprine (FLEXERIL) 10 MG tablet Take 1 tablet by mouth 3 (Three) Times a Day As Needed for Muscle Spasms.      fluticasone (FLONASE) 50 MCG/ACT nasal spray 1-2 sprays into the nostril(s) as directed by provider As Needed.      gabapentin (NEURONTIN) 300 MG capsule Take 1 capsule by mouth. 3 capsules at night and an additional 1-2 capsule during the day if needed      hydrOXYzine (ATARAX) 10 MG tablet Take 1 tablet by mouth 3 (Three) Times a Day As Needed for Anxiety.      insulin detemir (Levemir FlexPen) 100 UNIT/ML injection Inject 35 Units under the skin into the appropriate area at bedtime as directed. 15 mL 4    Insulin Lispro, 1 Unit Dial, (HumaLOG KwikPen) 100 UNIT/ML solution pen-injector Inject up to 25 Units under the skin into the appropriate area 3 times a day as directed. 30 mL 4    lisinopril (PRINIVIL,ZESTRIL) 20 MG tablet Take 1 tablet by  "mouth Daily.      ondansetron ODT (ZOFRAN-ODT) 4 MG disintegrating tablet Take 1 tablet by mouth Every 6 (Six) Hours As Needed for nausea or vomiting. 10 tablet 0    vitamin D (ERGOCALCIFEROL) 1.25 MG (70984 UT) capsule capsule Take 1 capsule by mouth 1 (One) Time Per Week. 5 capsule 4    [DISCONTINUED] Continuous Blood Gluc  (FreeStyle Damon 3 [place]) device Use As Directed. 1 each 0    [DISCONTINUED] Continuous Blood Gluc Sensor (FreeStyle Damon 3 Sensor) misc Use 1 sensor Every 14 (Fourteen) Days. 2 each 5     No current facility-administered medications on file prior to visit.        Review of Systems   Constitutional:  Positive for activity change, appetite change, fatigue and unexpected weight loss.   Eyes:  Positive for blurred vision.   Gastrointestinal:  Positive for abdominal pain.   Endocrine: Positive for polydipsia and polyuria.   Neurological:  Positive for numbness.   Psychiatric/Behavioral:  Positive for sleep disturbance.    All other systems reviewed and are negative.    /70 (BP Location: Left arm, Patient Position: Sitting, Cuff Size: Adult)   Pulse 63   Ht 165.1 cm (65\")   Wt 103 kg (228 lb)   LMP  (LMP Unknown)   SpO2 99%   BMI 37.94 kg/m²      Physical Exam  Vitals reviewed.   Constitutional:       Appearance: Normal appearance.   Eyes:      Extraocular Movements: Extraocular movements intact.   Cardiovascular:      Rate and Rhythm: Normal rate.      Pulses: Normal pulses.   Pulmonary:      Effort: Pulmonary effort is normal.   Skin:     General: Skin is warm.   Neurological:      Mental Status: She is alert and oriented to person, place, and time.   Psychiatric:         Mood and Affect: Mood normal.         Behavior: Behavior normal.         Thought Content: Thought content normal.         Judgment: Judgment normal.       CMP:  Lab Results   Component Value Date    BUN 16 11/05/2023    CREATININE 0.71 11/05/2023    EGFRIFNONA 106 07/21/2021    BCR 22.5 11/05/2023     " "(L) 11/05/2023    K 4.4 11/05/2023    CO2 25.8 11/05/2023    CALCIUM 9.3 11/05/2023    ALBUMIN 3.9 11/05/2023    BILITOT 0.4 11/05/2023    ALKPHOS 67 11/05/2023    AST 17 11/05/2023    ALT 11 11/05/2023     Lipid Panel:  No results found for: \"CHOL\", \"TRIG\", \"HDL\", \"VLDL\", \"LDL\"    HbA1c:  Lab Results   Component Value Date    HGBA1C 11.5 (A) 01/29/2024    HGBA1C 10.3 05/25/2022     Glucose:  Lab Results   Component Value Date    POCGLU 310 (A) 01/29/2024     Microalbumin:  No results found for: \"MALBCRERATIO\"  TSH:  Lab Results   Component Value Date    TSH 0.928 07/21/2021       Assessment and Plan    Diagnoses and all orders for this visit:    1. Type 2 diabetes mellitus with hyperglycemia, with long-term current use of insulin (Primary)  Assessment & Plan:  -Diabetes is slightly improving with A1c down from 12 to 11.5.  -Discussed in length with the patient the dietary and exercise guidelines.    -Discussed importance of yearly eye exams.  She will keep the scheduled appointments.  -Discussed importance of checking BG's. We are not able to download her CGM in office today.  Review of this on  shows range of 260-HI.  Medication adjustments as follows:  -Take Humalog 50 units before meals plus extra if your blood sugar is high.   If BG is 150-199: extra 3 unit  -249: extra 6 units  -299: extra 9 units  -349: extra 12 units  +: extra 15 units.    -Increase Lantus 40 units at night, if in 3 days, fastings still above 180, increase to 50 units at night.  -Discussed use of an Omnipod insulin pump.  She would like to start using this system with a Dexcom CGM.  Will send this in for her to start using.  -S/S hypoglycemia reviewed with Rule of 15's advised.  -Follow-up in 3 months.    Orders:  -     POC Glycosylated Hemoglobin (Hb A1C)  -     POC Glucose, Blood    Other orders  -     Continuous Blood Gluc  (Dexcom G6 ) device; Use 1 each Continuous.  Dispense: 1 each; " Refill: 0  -     Continuous Blood Gluc Transmit (Dexcom G6 Transmitter) misc; Use 1 each Every 3 (Three) Months.  Dispense: 1 each; Refill: 3  -     Continuous Blood Gluc Sensor (Dexcom G6 Sensor); Use Every 10 (Ten) Days.  Dispense: 3 each; Refill: 11  -     Insulin Disposable Pump (Omnipod 5 G6 Intro, Gen 5,) kit; Use as directed.  Dispense: 1 kit; Refill: 0         Return in about 3 months (around 4/29/2024) for Follow-up appointment, A1C. The patient was instructed to contact the clinic with any interval questions or concerns.    This document has been electronically signed by HAVEN Montelongo  January 29, 2024 10:40 EST  Endocrinology

## 2024-01-29 NOTE — ASSESSMENT & PLAN NOTE
-Diabetes is slightly improving with A1c down from 12 to 11.5.  -Discussed in length with the patient the dietary and exercise guidelines.    -Discussed importance of yearly eye exams.  She will keep the scheduled appointments.  -Discussed importance of checking BG's. We are not able to download her CGM in office today.  Review of this on  shows range of 260-HI.  Medication adjustments as follows:  -Take Humalog 50 units before meals plus extra if your blood sugar is high.   If BG is 150-199: extra 3 unit  -249: extra 6 units  -299: extra 9 units  -349: extra 12 units  +: extra 15 units.    -Increase Lantus 40 units at night, if in 3 days, fastings still above 180, increase to 50 units at night.  -Discussed use of an Omnipod insulin pump.  She would like to start using this system with a Dexcom CGM.  Will send this in for her to start using.  -S/S hypoglycemia reviewed with Rule of 15's advised.  -Follow-up in 3 months.

## 2024-02-13 RX ORDER — INSULIN PMP CART,AUT,G6/7,CNTR
1 EACH SUBCUTANEOUS DAILY
Qty: 30 EACH | Refills: 5 | Status: SHIPPED | OUTPATIENT
Start: 2024-02-13

## 2024-02-14 ENCOUNTER — SPECIALTY PHARMACY (OUTPATIENT)
Dept: PHARMACY | Facility: HOSPITAL | Age: 49
End: 2024-02-14
Payer: COMMERCIAL

## 2024-02-15 ENCOUNTER — SPECIALTY PHARMACY (OUTPATIENT)
Dept: PHARMACY | Facility: HOSPITAL | Age: 49
End: 2024-02-15
Payer: COMMERCIAL

## 2024-02-15 ENCOUNTER — OFFICE VISIT (OUTPATIENT)
Dept: ENDOCRINOLOGY | Facility: CLINIC | Age: 49
End: 2024-02-15
Payer: COMMERCIAL

## 2024-02-15 VITALS
DIASTOLIC BLOOD PRESSURE: 87 MMHG | HEIGHT: 65 IN | OXYGEN SATURATION: 97 % | WEIGHT: 233.6 LBS | BODY MASS INDEX: 38.92 KG/M2 | SYSTOLIC BLOOD PRESSURE: 161 MMHG | HEART RATE: 65 BPM

## 2024-02-15 DIAGNOSIS — Z79.4 TYPE 2 DIABETES MELLITUS WITH HYPERGLYCEMIA, WITH LONG-TERM CURRENT USE OF INSULIN: Primary | ICD-10-CM

## 2024-02-15 DIAGNOSIS — E11.65 TYPE 2 DIABETES MELLITUS WITH HYPERGLYCEMIA, WITH LONG-TERM CURRENT USE OF INSULIN: Primary | ICD-10-CM

## 2024-02-15 RX ORDER — INSULIN LISPRO 100 [IU]/ML
INJECTION, SOLUTION INTRAVENOUS; SUBCUTANEOUS
Qty: 180 ML | Refills: 1 | Status: SHIPPED | OUTPATIENT
Start: 2024-02-15

## 2024-03-01 ENCOUNTER — SPECIALTY PHARMACY (OUTPATIENT)
Dept: PHARMACY | Facility: HOSPITAL | Age: 49
End: 2024-03-01
Payer: COMMERCIAL

## 2024-03-12 ENCOUNTER — SPECIALTY PHARMACY (OUTPATIENT)
Dept: PHARMACY | Facility: HOSPITAL | Age: 49
End: 2024-03-12
Payer: COMMERCIAL

## 2024-03-22 ENCOUNTER — TRANSCRIBE ORDERS (OUTPATIENT)
Dept: ADMINISTRATIVE | Facility: HOSPITAL | Age: 49
End: 2024-03-22
Payer: COMMERCIAL

## 2024-03-22 DIAGNOSIS — M54.12 RADICULOPATHY, CERVICAL: Primary | ICD-10-CM

## 2024-03-29 ENCOUNTER — HOSPITAL ENCOUNTER (OUTPATIENT)
Dept: MRI IMAGING | Facility: HOSPITAL | Age: 49
Discharge: HOME | End: 2024-03-29
Payer: COMMERCIAL

## 2024-03-29 ENCOUNTER — SPECIALTY PHARMACY (OUTPATIENT)
Dept: PHARMACY | Facility: HOSPITAL | Age: 49
End: 2024-03-29
Payer: COMMERCIAL

## 2024-03-29 DIAGNOSIS — M54.12 RADICULOPATHY, CERVICAL: ICD-10-CM

## 2024-03-29 PROCEDURE — 72141 MRI NECK SPINE W/O DYE: CPT

## 2024-04-08 ENCOUNTER — SPECIALTY PHARMACY (OUTPATIENT)
Dept: PHARMACY | Facility: HOSPITAL | Age: 49
End: 2024-04-08
Payer: COMMERCIAL

## 2024-04-08 NOTE — PROGRESS NOTES
Specialty Pharmacy Refill Coordination Note     Janet is a 49 y.o. female contacted today regarding refills of  Omnipod specialty medication(s).    Reviewed and verified with patient:       Specialty medication(s) and dose(s) confirmed: yes    Refill Questions      Flowsheet Row Most Recent Value   Changes to allergies? No   Changes to medications? No   New conditions or infections since last clinic visit No   Unplanned office visit, urgent care, ED, or hospital admission in the last 4 weeks  No   How does patient/caregiver feel medication is working? Very good   Financial problems or insurance changes  No   If yes, describe changes in insurance or financial issues. na   Since the previous refill, were any specialty medication doses or scheduled injections missed or delayed?  No   Does this patient require a clinical escalation to a pharmacist? No            Delivery Questions      Flowsheet Row Most Recent Value   Copay verified? No   Copay amount na   Copay form of payment No copayment ($0)                   Follow-up: 30 day(s)     Carla Ricardo Pharmacy Technician  Specialty Pharmacy Technician

## 2024-04-25 ENCOUNTER — OFFICE VISIT (OUTPATIENT)
Dept: CARDIOLOGY | Facility: CLINIC | Age: 49
End: 2024-04-25
Payer: COMMERCIAL

## 2024-04-25 VITALS
HEIGHT: 65 IN | OXYGEN SATURATION: 97 % | BODY MASS INDEX: 37.62 KG/M2 | DIASTOLIC BLOOD PRESSURE: 69 MMHG | HEART RATE: 63 BPM | WEIGHT: 225.8 LBS | SYSTOLIC BLOOD PRESSURE: 104 MMHG | RESPIRATION RATE: 16 BRPM

## 2024-04-25 DIAGNOSIS — Z01.810 PREOPERATIVE CARDIOVASCULAR EXAMINATION: Primary | ICD-10-CM

## 2024-04-25 PROCEDURE — 3074F SYST BP LT 130 MM HG: CPT | Performed by: INTERNAL MEDICINE

## 2024-04-25 PROCEDURE — 99203 OFFICE O/P NEW LOW 30 MIN: CPT | Performed by: INTERNAL MEDICINE

## 2024-04-25 PROCEDURE — 93000 ELECTROCARDIOGRAM COMPLETE: CPT | Performed by: INTERNAL MEDICINE

## 2024-04-25 PROCEDURE — 3078F DIAST BP <80 MM HG: CPT | Performed by: INTERNAL MEDICINE

## 2024-04-25 RX ORDER — DEXTROAMPHETAMINE SACCHARATE, AMPHETAMINE ASPARTATE, DEXTROAMPHETAMINE SULFATE AND AMPHETAMINE SULFATE 2.5; 2.5; 2.5; 2.5 MG/1; MG/1; MG/1; MG/1
10 TABLET ORAL DAILY
COMMUNITY

## 2024-04-25 RX ORDER — HYDROCHLOROTHIAZIDE 25 MG/1
25 TABLET ORAL DAILY
COMMUNITY

## 2024-04-25 NOTE — PROGRESS NOTES
Nat Pascual APRN  Janet Hurtado  1975 04/25/2024    Patient Active Problem List   Diagnosis    Hypertension    Type 2 diabetes mellitus with hyperglycemia    Class 3 severe obesity in adult    Other chest pain    Hyperlipidemia LDL goal <100    Hypercholesterolemia       Dear Nat:    Subjective     Janet Hurtado is a 49 y.o. female with the problems as listed above, presents    Chief complaint: Preoperative cardiac evaluation and cardiac clearance prior to undergoing bariatric surgery of    History of Present Illness: Ms. Hurtado is a pleasant 49-year-old  female with no history of known heart disease or coronary artery disease, is here for preoperative cardiac evaluation and cardiac clearance prior to undergoing bariatric surgery.  On further questioning she denies any complaints of chest pains or shortness of breath.  She denies any palpitations but has occasional dizziness but no syncope.  She had a normal myocardial fusion study on 12/3/2018.    Complete Transthoracic Echocardiogram with Complete Doppler and Color Flow    Accession Number: [id number]   Date of Study: 12/3/18   Ordering Provider: Tod Jessica MD   Clinical Indications: Chest Pain        [facility]  Performing Staff   Cardiology: Terrance Kwok MD    Tech: Brittani Zapien   Support Staff: Deisy Atwood        Clinical Indication    Chest Pain   Dx: Chest pain, unspecified type [R07.9 (ICD-10-CM)]     Interpretation Summary    Normal left ventricular cavity size and wall thickness noted. All left ventricular wall segments contract normally.  Estimated EF appears to be in the range of 61 - 65%.  The aortic valve is structurally normal. No aortic valve regurgitation is present. No aortic valve stenosis is present.  The mitral valve is normal in structure. No mitral valve regurgitation is present. No significant mitral valve stenosis is present.  The tricuspid valve is normal. No tricuspid valve stenosis  is present. Trace tricuspid valve regurgitation is present. Estimated right ventricular systolic pressure from tricuspid regurgitation is normal (<35 mmHg).  There is no evidence of pericardial effusion.     Regadenoson Stress Test with Myocardial Perfusion SPECT (Multi Study)    Accession Number: [id number]   Date of Study: 12/3/18   Ordering Provider: Tod Jessica MD   Clinical Indications: Chest Pain        [facility]  Performing Staff   Test Supervisor, ECG [place], SPECT [place]: Terrance Kwok MD    Tech: Christiano Paredes   Support Staff: Tevin Dunn        Interpretation Summary  Findings consistent with a normal ECG stress test.  Myocardial perfusion imaging indicates a normal myocardial perfusion study with no evidence of ischemia.  Left ventricular ejection fraction is hyperdynamic (Calculated EF > 70%).  Impressions are consistent with a low risk study.    No Known Allergies:    Current Outpatient Medications:     amphetamine-dextroamphetamine (ADDERALL) 10 MG tablet, Take 1 tablet by mouth Daily., Disp: , Rfl:     cloNIDine (CATAPRES) 0.1 MG tablet, Take 2 tablets by mouth 2 (Two) Times a Day As Needed for High Blood Pressure., Disp: , Rfl:     cyclobenzaprine (FLEXERIL) 10 MG tablet, Take 1 tablet by mouth 3 (Three) Times a Day As Needed for Muscle Spasms., Disp: , Rfl:     fluticasone (FLONASE) 50 MCG/ACT nasal spray, 1-2 sprays into the nostril(s) as directed by provider As Needed., Disp: , Rfl:     gabapentin (NEURONTIN) 300 MG capsule, Take 1 capsule by mouth. 3 capsules at night and an additional 1-2 capsule during the day if needed, Disp: , Rfl:     hydroCHLOROthiazide 25 MG tablet, Take 1 tablet by mouth Daily., Disp: , Rfl:     hydrOXYzine (ATARAX) 10 MG tablet, Take 1 tablet by mouth 3 (Three) Times a Day As Needed for Anxiety., Disp: , Rfl:     Insulin Disposable Pump (Omnipod 5 G6 Pods, Gen 5,) misc, Use 1 Daily., Disp: 30 each, Rfl: 5    lisinopril (PRINIVIL,ZESTRIL) 20 MG  tablet, Take 1 tablet by mouth Daily., Disp: , Rfl:     ondansetron ODT (ZOFRAN-ODT) 4 MG disintegrating tablet, Take 1 tablet by mouth Every 6 (Six) Hours As Needed for nausea or vomiting., Disp: 10 tablet, Rfl: 0    vitamin D (ERGOCALCIFEROL) 1.25 MG (95068 UT) capsule capsule, Take 1 capsule by mouth 1 (One) Time Per Week., Disp: 5 capsule, Rfl: 4    Continuous Blood Gluc  (Dexcom G6 ) device, Use 1 each Continuous., Disp: 1 each, Rfl: 0    Continuous Glucose Sensor (Dexcom G6 Sensor), Use Every 10 (Ten) Days., Disp: 3 each, Rfl: 11    Continuous Glucose Transmitter (Dexcom G6 Transmitter) misc, Use 1 each Every 3 (Three) Months., Disp: 1 each, Rfl: 3    insulin detemir (Levemir FlexPen) 100 UNIT/ML injection, Inject 35 Units under the skin into the appropriate area at bedtime as directed. (Patient not taking: Reported on 2/15/2024), Disp: 15 mL, Rfl: 4    Insulin Lispro (HumaLOG) 100 UNIT/ML injection, For use in insulin pump. Max daily dose of 200 units, Disp: 180 mL, Rfl: 1    Insulin Lispro, 1 Unit Dial, (HumaLOG KwikPen) 100 UNIT/ML solution pen-injector, Inject up to 25 Units under the skin into the appropriate area 3 times a day as directed., Disp: 30 mL, Rfl: 4    Past Medical History:   Diagnosis Date    ADHD     Depression     Diabetes mellitus     Fibromyalgia     Hypertension     Uterine cancer          Past Surgical History:   Procedure Laterality Date    ABDOMINAL SURGERY      BREAST BIOPSY Left 2014     SECTION      CHOLECYSTECTOMY      HYSTERECTOMY  2008     Family History   Problem Relation Age of Onset    Heart attack Mother     Heart disease Mother     No Known Problems Father     No Known Problems Sister     No Known Problems Brother     Breast cancer Maternal Aunt     No Known Problems Maternal Grandmother     No Known Problems Maternal Grandfather     No Known Problems Paternal Grandmother     Heart attack Paternal Grandfather     No Known Problems Daughter     No  "Known Problems Son     No Known Problems Cousin     Rheum arthritis Neg Hx     Osteoarthritis Neg Hx     Asthma Neg Hx     Diabetes Neg Hx     Heart failure Neg Hx     Hyperlipidemia Neg Hx     Hypertension Neg Hx     Migraines Neg Hx     Rashes / Skin problems Neg Hx     Seizures Neg Hx     Stroke Neg Hx     Thyroid disease Neg Hx      Social History     Tobacco Use    Smoking status: Never    Smokeless tobacco: Never   Vaping Use    Vaping status: Never Used   Substance Use Topics    Alcohol use: No    Drug use: No     Review of Systems   Constitutional: Negative for chills, fever, malaise/fatigue, weight gain and weight loss.   HENT:  Positive for hearing loss and tinnitus. Negative for congestion and nosebleeds.    Cardiovascular:  Positive for leg swelling. Negative for chest pain, irregular heartbeat and orthopnea.   Respiratory:  Negative for cough, hemoptysis and shortness of breath.    Musculoskeletal:  Positive for back pain, joint pain, joint swelling, muscle cramps and muscle weakness.   Gastrointestinal:  Positive for constipation. Negative for abdominal pain, change in bowel habit, hematemesis, melena and vomiting.   Genitourinary:  Negative for dysuria, frequency and hematuria.   Neurological:  Positive for headaches. Negative for focal weakness, light-headedness and weakness.   Psychiatric/Behavioral:  Positive for depression and memory loss. The patient has insomnia and is nervous/anxious.      Objective   Blood pressure 104/69, pulse 63, resp. rate 16, height 165.1 cm (65\"), weight 102 kg (225 lb 12.8 oz), SpO2 97%, not currently breastfeeding.  Body mass index is 37.58 kg/m².    Vitals reviewed.   Constitutional:       Appearance: Well-developed.   Eyes:      Conjunctiva/sclera: Conjunctivae normal.   HENT:      Head: Normocephalic.   Neck:      Thyroid: No thyromegaly.      Vascular: No JVD.      Trachea: No tracheal deviation.   Pulmonary:      Effort: No respiratory distress.      Breath " "sounds: Normal breath sounds. No wheezing. No rales.   Cardiovascular:      PMI at left midclavicular line. Normal rate. Regular rhythm. Normal S1. Normal S2.       Murmurs: There is no murmur.      No gallop.  No click. No rub.   Pulses:     Intact distal pulses.   Edema:     Peripheral edema absent.   Abdominal:      General: Bowel sounds are normal.      Palpations: Abdomen is soft. There is no abdominal mass.      Tenderness: There is no abdominal tenderness.   Musculoskeletal:      Cervical back: Normal range of motion and neck supple. Skin:     General: Skin is warm and dry.   Neurological:      Mental Status: Alert and oriented to person, place, and time.      Cranial Nerves: No cranial nerve deficit.         Lab Results   Component Value Date     (L) 11/05/2023    K 4.4 11/05/2023    CL 97 (L) 11/05/2023    CO2 25.8 11/05/2023    BUN 16 11/05/2023    CREATININE 0.71 11/05/2023    GLUCOSE 341 (H) 11/05/2023    CALCIUM 9.3 11/05/2023    AST 17 11/05/2023    ALT 11 11/05/2023    ALKPHOS 67 11/05/2023     Lab Results   Component Value Date    CKTOTAL 65 11/12/2018     Lab Results   Component Value Date    WBC 7.56 11/05/2023    HGB 13.1 11/05/2023    HCT 40.2 11/05/2023     11/05/2023     No results found for: \"INR\"  Lab Results   Component Value Date    MG 1.8 11/03/2023     Lab Results   Component Value Date    TSH 0.928 07/21/2021          ECG 12 Lead    Date/Time: 4/25/2024 2:35 PM  Performed by: Terrance Kwok MD    Authorized by: Terrance Kwok MD  Comparison: compared with previous ECG from 7/21/2021  Similar to previous ECG  Comparison to previous ECG: Except for the heart rate  Rhythm: sinus bradycardia  BPM: 59  Other findings: low voltage              Assessment & Plan    Diagnosis Plan    Preoperative cardiovascular examination          Recommendations  Orders Placed This Encounter   Procedures    ECG 12 Lead      Since her cardiac status appears to be stable at this time, we will go " out and clear her for the bariatric surgery as needed with an acceptable cardiac risk.           No follow-ups on file.    As always, Nat  I appreciate very much the opportunity to participate in the cardiovascular care of your patients. Please do not hesitate to call me with any questions with regards to Janet Hurtado's evaluation and management.     With Best Regards,        Terrance Kwok MD, [facility]    Please note that portions of this note were completed with a voice recognition program.

## 2024-04-25 NOTE — LETTER
April 26, 2024     [facility]  [address] [place] [id number]    Patient: Janet Hurtado   YOB: 1975   Date of Visit: 4/25/2024     Dear Nat:       Thank you for referring Janet Hurtado to me for evaluation. Below are the relevant portions of my assessment and plan of care.    If you have questions, please do not hesitate to call me. I look forward to following Janet along with you.         Sincerely,        Terrance Kwok MD        CC: No Recipients    Terrance Kwok MD  04/26/24 1156  Sign when Signing Visit  Nat Pascual APRN  Janet Hurtado  1975 04/25/2024    Patient Active Problem List   Diagnosis   • Hypertension   • Type 2 diabetes mellitus with hyperglycemia   • Class 3 severe obesity in adult   • Other chest pain   • Hyperlipidemia LDL goal <100   • Hypercholesterolemia       Dear Nat:    Subjective    Janet Hurtado is a 49 y.o. female with the problems as listed above, presents    Chief complaint: Preoperative cardiac evaluation and cardiac clearance prior to undergoing bariatric surgery of    History of Present Illness: Ms. Hurtado is a pleasant 49-year-old  female with no history of known heart disease or coronary artery disease, is here for preoperative cardiac evaluation and cardiac clearance prior to undergoing bariatric surgery.  On further questioning she denies any complaints of chest pains or shortness of breath.  She denies any palpitations but has occasional dizziness but no syncope.  She had a normal myocardial fusion study on 12/3/2018.    Complete Transthoracic Echocardiogram with Complete Doppler and Color Flow    Accession Number: [id number]   Date of Study: 12/3/18   Ordering Provider: Tod Jessica MD   Clinical Indications: Chest Pain        [facility]  Performing Staff   Cardiology: Terrance Kwok MD    Tech: Brittani Zapien   Support Staff: Deisy Atwood        Clinical Indication    Chest Pain   Dx:  Chest pain, unspecified type [R07.9 (ICD-10-CM)]     Interpretation Summary    Normal left ventricular cavity size and wall thickness noted. All left ventricular wall segments contract normally.  Estimated EF appears to be in the range of 61 - 65%.  The aortic valve is structurally normal. No aortic valve regurgitation is present. No aortic valve stenosis is present.  The mitral valve is normal in structure. No mitral valve regurgitation is present. No significant mitral valve stenosis is present.  The tricuspid valve is normal. No tricuspid valve stenosis is present. Trace tricuspid valve regurgitation is present. Estimated right ventricular systolic pressure from tricuspid regurgitation is normal (<35 mmHg).  There is no evidence of pericardial effusion.     Regadenoson Stress Test with Myocardial Perfusion SPECT (Multi Study)    Accession Number: [id number]   Date of Study: 12/3/18   Ordering Provider: Tod Jessica MD   Clinical Indications: Chest Pain        [facility]  Performing Staff   Test Supervisor, ECG [place], SPECT [place]: Terrance Kwok MD    Tech: Christiano Paredes   Support Staff: Tevin Dunn        Interpretation Summary  Findings consistent with a normal ECG stress test.  Myocardial perfusion imaging indicates a normal myocardial perfusion study with no evidence of ischemia.  Left ventricular ejection fraction is hyperdynamic (Calculated EF > 70%).  Impressions are consistent with a low risk study.    No Known Allergies:    Current Outpatient Medications:   •  amphetamine-dextroamphetamine (ADDERALL) 10 MG tablet, Take 1 tablet by mouth Daily., Disp: , Rfl:   •  cloNIDine (CATAPRES) 0.1 MG tablet, Take 2 tablets by mouth 2 (Two) Times a Day As Needed for High Blood Pressure., Disp: , Rfl:   •  cyclobenzaprine (FLEXERIL) 10 MG tablet, Take 1 tablet by mouth 3 (Three) Times a Day As Needed for Muscle Spasms., Disp: , Rfl:   •  fluticasone (FLONASE) 50 MCG/ACT nasal spray, 1-2 sprays  into the nostril(s) as directed by provider As Needed., Disp: , Rfl:   •  gabapentin (NEURONTIN) 300 MG capsule, Take 1 capsule by mouth. 3 capsules at night and an additional 1-2 capsule during the day if needed, Disp: , Rfl:   •  hydroCHLOROthiazide 25 MG tablet, Take 1 tablet by mouth Daily., Disp: , Rfl:   •  hydrOXYzine (ATARAX) 10 MG tablet, Take 1 tablet by mouth 3 (Three) Times a Day As Needed for Anxiety., Disp: , Rfl:   •  Insulin Disposable Pump (Omnipod 5 G6 Pods, Gen 5,) misc, Use 1 Daily., Disp: 30 each, Rfl: 5  •  lisinopril (PRINIVIL,ZESTRIL) 20 MG tablet, Take 1 tablet by mouth Daily., Disp: , Rfl:   •  ondansetron ODT (ZOFRAN-ODT) 4 MG disintegrating tablet, Take 1 tablet by mouth Every 6 (Six) Hours As Needed for nausea or vomiting., Disp: 10 tablet, Rfl: 0  •  vitamin D (ERGOCALCIFEROL) 1.25 MG (24650 UT) capsule capsule, Take 1 capsule by mouth 1 (One) Time Per Week., Disp: 5 capsule, Rfl: 4  •  Continuous Blood Gluc  (Dexcom G6 ) device, Use 1 each Continuous., Disp: 1 each, Rfl: 0  •  Continuous Glucose Sensor (Dexcom G6 Sensor), Use Every 10 (Ten) Days., Disp: 3 each, Rfl: 11  •  Continuous Glucose Transmitter (Dexcom G6 Transmitter) misc, Use 1 each Every 3 (Three) Months., Disp: 1 each, Rfl: 3  •  insulin detemir (Levemir FlexPen) 100 UNIT/ML injection, Inject 35 Units under the skin into the appropriate area at bedtime as directed. (Patient not taking: Reported on 2/15/2024), Disp: 15 mL, Rfl: 4  •  Insulin Lispro (HumaLOG) 100 UNIT/ML injection, For use in insulin pump. Max daily dose of 200 units, Disp: 180 mL, Rfl: 1  •  Insulin Lispro, 1 Unit Dial, (HumaLOG KwikPen) 100 UNIT/ML solution pen-injector, Inject up to 25 Units under the skin into the appropriate area 3 times a day as directed., Disp: 30 mL, Rfl: 4    Past Medical History:   Diagnosis Date   • ADHD    • Depression    • Diabetes mellitus    • Fibromyalgia    • Hypertension    • Uterine cancer     2008     Past  Surgical History:   Procedure Laterality Date   • ABDOMINAL SURGERY     • BREAST BIOPSY Left    •  SECTION     • CHOLECYSTECTOMY     • HYSTERECTOMY       Family History   Problem Relation Age of Onset   • Heart attack Mother    • Heart disease Mother    • No Known Problems Father    • No Known Problems Sister    • No Known Problems Brother    • Breast cancer Maternal Aunt    • No Known Problems Maternal Grandmother    • No Known Problems Maternal Grandfather    • No Known Problems Paternal Grandmother    • Heart attack Paternal Grandfather    • No Known Problems Daughter    • No Known Problems Son    • No Known Problems Cousin    • Rheum arthritis Neg Hx    • Osteoarthritis Neg Hx    • Asthma Neg Hx    • Diabetes Neg Hx    • Heart failure Neg Hx    • Hyperlipidemia Neg Hx    • Hypertension Neg Hx    • Migraines Neg Hx    • Rashes / Skin problems Neg Hx    • Seizures Neg Hx    • Stroke Neg Hx    • Thyroid disease Neg Hx      Social History     Tobacco Use   • Smoking status: Never   • Smokeless tobacco: Never   Vaping Use   • Vaping status: Never Used   Substance Use Topics   • Alcohol use: No   • Drug use: No     Review of Systems   Constitutional: Negative for chills, fever, malaise/fatigue, weight gain and weight loss.   HENT:  Positive for hearing loss and tinnitus. Negative for congestion and nosebleeds.    Cardiovascular:  Positive for leg swelling. Negative for chest pain, irregular heartbeat and orthopnea.   Respiratory:  Negative for cough, hemoptysis and shortness of breath.    Musculoskeletal:  Positive for back pain, joint pain, joint swelling, muscle cramps and muscle weakness.   Gastrointestinal:  Positive for constipation. Negative for abdominal pain, change in bowel habit, hematemesis, melena and vomiting.   Genitourinary:  Negative for dysuria, frequency and hematuria.   Neurological:  Positive for headaches. Negative for focal weakness, light-headedness and weakness.  "  Psychiatric/Behavioral:  Positive for depression and memory loss. The patient has insomnia and is nervous/anxious.      Objective  Blood pressure 104/69, pulse 63, resp. rate 16, height 165.1 cm (65\"), weight 102 kg (225 lb 12.8 oz), SpO2 97%, not currently breastfeeding.  Body mass index is 37.58 kg/m².    Vitals reviewed.   Constitutional:       Appearance: Well-developed.   Eyes:      Conjunctiva/sclera: Conjunctivae normal.   HENT:      Head: Normocephalic.   Neck:      Thyroid: No thyromegaly.      Vascular: No JVD.      Trachea: No tracheal deviation.   Pulmonary:      Effort: No respiratory distress.      Breath sounds: Normal breath sounds. No wheezing. No rales.   Cardiovascular:      PMI at left midclavicular line. Normal rate. Regular rhythm. Normal S1. Normal S2.       Murmurs: There is no murmur.      No gallop.  No click. No rub.   Pulses:     Intact distal pulses.   Edema:     Peripheral edema absent.   Abdominal:      General: Bowel sounds are normal.      Palpations: Abdomen is soft. There is no abdominal mass.      Tenderness: There is no abdominal tenderness.   Musculoskeletal:      Cervical back: Normal range of motion and neck supple. Skin:     General: Skin is warm and dry.   Neurological:      Mental Status: Alert and oriented to person, place, and time.      Cranial Nerves: No cranial nerve deficit.         Lab Results   Component Value Date     (L) 11/05/2023    K 4.4 11/05/2023    CL 97 (L) 11/05/2023    CO2 25.8 11/05/2023    BUN 16 11/05/2023    CREATININE 0.71 11/05/2023    GLUCOSE 341 (H) 11/05/2023    CALCIUM 9.3 11/05/2023    AST 17 11/05/2023    ALT 11 11/05/2023    ALKPHOS 67 11/05/2023     Lab Results   Component Value Date    CKTOTAL 65 11/12/2018     Lab Results   Component Value Date    WBC 7.56 11/05/2023    HGB 13.1 11/05/2023    HCT 40.2 11/05/2023     11/05/2023     No results found for: \"INR\"  Lab Results   Component Value Date    MG 1.8 11/03/2023     Lab " Results   Component Value Date    TSH 0.928 07/21/2021          ECG 12 Lead    Date/Time: 4/25/2024 2:35 PM  Performed by: Terrance Kwok MD    Authorized by: Terrance Kwok MD  Comparison: compared with previous ECG from 7/21/2021  Similar to previous ECG  Comparison to previous ECG: Except for the heart rate  Rhythm: sinus bradycardia  BPM: 59  Other findings: low voltage              Assessment & Plan   Diagnosis Plan    Preoperative cardiovascular examination          Recommendations  Orders Placed This Encounter   Procedures   • ECG 12 Lead      Since her cardiac status appears to be stable at this time, we will go out and clear her for the bariatric surgery as needed with an acceptable cardiac risk.           No follow-ups on file.    As always, Nat  I appreciate very much the opportunity to participate in the cardiovascular care of your patients. Please do not hesitate to call me with any questions with regards to Janet Hurtado's evaluation and management.     With Best Regards,        Terrance Kwok MD, [facility]    Please note that portions of this note were completed with a voice recognition program.

## 2024-05-08 ENCOUNTER — OFFICE VISIT (OUTPATIENT)
Dept: PULMONOLOGY | Facility: CLINIC | Age: 49
End: 2024-05-08
Payer: COMMERCIAL

## 2024-05-08 ENCOUNTER — SPECIALTY PHARMACY (OUTPATIENT)
Dept: PHARMACY | Facility: HOSPITAL | Age: 49
End: 2024-05-08
Payer: COMMERCIAL

## 2024-05-08 VITALS
OXYGEN SATURATION: 98 % | TEMPERATURE: 96.6 F | WEIGHT: 223.2 LBS | DIASTOLIC BLOOD PRESSURE: 86 MMHG | HEART RATE: 61 BPM | HEIGHT: 65 IN | BODY MASS INDEX: 37.19 KG/M2 | SYSTOLIC BLOOD PRESSURE: 126 MMHG

## 2024-05-08 DIAGNOSIS — E66.9 OBESITY (BMI 30-39.9): ICD-10-CM

## 2024-05-08 DIAGNOSIS — Z01.811 PREOPERATIVE RESPIRATORY EXAMINATION: Primary | ICD-10-CM

## 2024-05-08 RX ORDER — OMEPRAZOLE MAGNESIUM, AMOXICILLIN AND RIFABUTIN 10; 250; 12.5 MG/1; MG/1; MG/1
CAPSULE, DELAYED RELEASE ORAL
COMMUNITY
Start: 2024-05-06

## 2024-05-08 RX ORDER — ACETAMINOPHEN AND CODEINE PHOSPHATE 300; 30 MG/1; MG/1
TABLET ORAL
COMMUNITY
Start: 2024-05-03

## 2024-05-10 ENCOUNTER — OFFICE VISIT (OUTPATIENT)
Dept: ENDOCRINOLOGY | Facility: CLINIC | Age: 49
End: 2024-05-10
Payer: COMMERCIAL

## 2024-05-10 ENCOUNTER — SPECIALTY PHARMACY (OUTPATIENT)
Dept: PHARMACY | Facility: HOSPITAL | Age: 49
End: 2024-05-10
Payer: COMMERCIAL

## 2024-05-10 VITALS
DIASTOLIC BLOOD PRESSURE: 82 MMHG | HEART RATE: 94 BPM | BODY MASS INDEX: 36.78 KG/M2 | SYSTOLIC BLOOD PRESSURE: 145 MMHG | OXYGEN SATURATION: 96 % | WEIGHT: 221 LBS

## 2024-05-10 DIAGNOSIS — E11.65 TYPE 2 DIABETES MELLITUS WITH HYPERGLYCEMIA, WITH LONG-TERM CURRENT USE OF INSULIN: ICD-10-CM

## 2024-05-10 DIAGNOSIS — Z79.4 TYPE 2 DIABETES MELLITUS WITH HYPERGLYCEMIA, WITH LONG-TERM CURRENT USE OF INSULIN: Primary | ICD-10-CM

## 2024-05-10 DIAGNOSIS — E11.65 TYPE 2 DIABETES MELLITUS WITH HYPERGLYCEMIA, WITH LONG-TERM CURRENT USE OF INSULIN: Primary | ICD-10-CM

## 2024-05-10 DIAGNOSIS — Z79.4 TYPE 2 DIABETES MELLITUS WITH HYPERGLYCEMIA, WITH LONG-TERM CURRENT USE OF INSULIN: ICD-10-CM

## 2024-05-10 LAB
ALBUMIN UR-MCNC: <1.2 MG/DL
CREAT UR-MCNC: 78.7 MG/DL
EXPIRATION DATE: ABNORMAL
GLUCOSE BLDC GLUCOMTR-MCNC: 152 MG/DL (ref 70–130)
Lab: ABNORMAL
MICROALBUMIN/CREAT UR: NORMAL MG/G{CREAT}

## 2024-05-10 PROCEDURE — 82570 ASSAY OF URINE CREATININE: CPT | Performed by: NURSE PRACTITIONER

## 2024-05-10 PROCEDURE — 82043 UR ALBUMIN QUANTITATIVE: CPT | Performed by: NURSE PRACTITIONER

## 2024-05-10 RX ORDER — PROCHLORPERAZINE 25 MG/1
1 SUPPOSITORY RECTAL
Qty: 1 EACH | Refills: 3 | Status: SHIPPED | OUTPATIENT
Start: 2024-05-10

## 2024-05-10 RX ORDER — GLUCAGON 1 MG
1 KIT INJECTION AS NEEDED
Qty: 1 EACH | Refills: 5 | Status: SHIPPED | OUTPATIENT
Start: 2024-05-10

## 2024-05-10 RX ORDER — INSULIN LISPRO 100 [IU]/ML
INJECTION, SOLUTION INTRAVENOUS; SUBCUTANEOUS
Qty: 180 ML | Refills: 1 | Status: SHIPPED | OUTPATIENT
Start: 2024-05-10

## 2024-05-10 RX ORDER — PROCHLORPERAZINE 25 MG/1
SUPPOSITORY RECTAL
Qty: 3 EACH | Refills: 11 | Status: SHIPPED | OUTPATIENT
Start: 2024-05-10

## 2024-05-10 RX ORDER — INSULIN PMP CART,AUT,G6/7,CNTR
1 EACH SUBCUTANEOUS DAILY
Qty: 30 EACH | Refills: 5 | Status: SHIPPED | OUTPATIENT
Start: 2024-05-10

## 2024-05-28 ENCOUNTER — SPECIALTY PHARMACY (OUTPATIENT)
Dept: PHARMACY | Facility: HOSPITAL | Age: 49
End: 2024-05-28
Payer: COMMERCIAL

## 2024-05-28 NOTE — PROGRESS NOTES
Specialty Pharmacy Refill Coordination Note     Janet is a 49 y.o. female contacted today regarding refills of  Dexcom sensor, and omnipod specialty medication(s).    Reviewed and verified with patient:       Specialty medication(s) and dose(s) confirmed: yes    Refill Questions      Flowsheet Row Most Recent Value   Changes to allergies? No   Changes to medications? No   New conditions or infections since last clinic visit No   Unplanned office visit, urgent care, ED, or hospital admission in the last 4 weeks  No   How does patient/caregiver feel medication is working? Very good   Financial problems or insurance changes  No   If yes, describe changes in insurance or financial issues. na   Since the previous refill, were any specialty medication doses or scheduled injections missed or delayed?  No   Does this patient require a clinical escalation to a pharmacist? No            Delivery Questions      Flowsheet Row Most Recent Value   Copay verified? No   Copay amount na   Copay form of payment No copayment ($0)                   Follow-up: 30 day(s)     Carla Ricardo, Pharmacy Technician  Specialty Pharmacy Technician

## 2024-06-24 ENCOUNTER — SPECIALTY PHARMACY (OUTPATIENT)
Dept: PHARMACY | Facility: HOSPITAL | Age: 49
End: 2024-06-24
Payer: COMMERCIAL

## 2024-06-24 NOTE — PROGRESS NOTES
Specialty Pharmacy Refill Coordination Note     Janet is a 49 y.o. female contacted today regarding refills of  Dexcom sensor specialty medication(s).    Reviewed and verified with patient:       Specialty medication(s) and dose(s) confirmed: yes    Refill Questions      Flowsheet Row Most Recent Value   Changes to allergies? No   Changes to medications? No   New conditions or infections since last clinic visit No   Unplanned office visit, urgent care, ED, or hospital admission in the last 4 weeks  No   How does patient/caregiver feel medication is working? Very good   Financial problems or insurance changes  No   Since the previous refill, were any specialty medication doses or scheduled injections missed or delayed?  No   Does this patient require a clinical escalation to a pharmacist? No            Delivery Questions      Flowsheet Row Most Recent Value   Copay verified? No   Copay amount na   Copay form of payment No copayment ($0)                   Follow-up: 30 day(s)     Carla Ricardo Pharmacy Technician  Specialty Pharmacy Technician

## 2024-08-12 ENCOUNTER — TELEPHONE (OUTPATIENT)
Dept: ENDOCRINOLOGY | Facility: CLINIC | Age: 49
End: 2024-08-12
Payer: COMMERCIAL

## 2024-08-12 NOTE — TELEPHONE ENCOUNTER
Please let patient know that she will need to make sure that she is eating every 2-3 hours and ensuring that she is meeting her dietary goals from her bariatric surgeon.  If she is continuing to have issues with hypoglycemia after that, then she will need to discuss this with them so that they can adjust her requirements to offset hypoglycemia.

## 2025-03-21 ENCOUNTER — TRANSCRIBE ORDERS (OUTPATIENT)
Dept: ADMINISTRATIVE | Facility: HOSPITAL | Age: 50
End: 2025-03-21
Payer: COMMERCIAL

## 2025-03-21 DIAGNOSIS — Z12.31 VISIT FOR SCREENING MAMMOGRAM: Primary | ICD-10-CM

## 2025-06-23 ENCOUNTER — OFFICE VISIT (OUTPATIENT)
Age: 50
End: 2025-06-23
Payer: COMMERCIAL

## 2025-06-23 VITALS
HEIGHT: 65 IN | BODY MASS INDEX: 27.14 KG/M2 | WEIGHT: 162.9 LBS | SYSTOLIC BLOOD PRESSURE: 110 MMHG | DIASTOLIC BLOOD PRESSURE: 72 MMHG

## 2025-06-23 DIAGNOSIS — M25.531 RIGHT WRIST PAIN: ICD-10-CM

## 2025-06-23 DIAGNOSIS — M65.4 DE QUERVAIN'S TENOSYNOVITIS: Primary | ICD-10-CM

## 2025-06-23 PROCEDURE — 3078F DIAST BP <80 MM HG: CPT | Performed by: PLASTIC SURGERY

## 2025-06-23 PROCEDURE — 20550 NJX 1 TENDON SHEATH/LIGAMENT: CPT | Performed by: PLASTIC SURGERY

## 2025-06-23 PROCEDURE — 99204 OFFICE O/P NEW MOD 45 MIN: CPT | Performed by: PLASTIC SURGERY

## 2025-06-23 PROCEDURE — 3074F SYST BP LT 130 MM HG: CPT | Performed by: PLASTIC SURGERY

## 2025-06-23 PROCEDURE — 1160F RVW MEDS BY RX/DR IN RCRD: CPT | Performed by: PLASTIC SURGERY

## 2025-06-23 PROCEDURE — 1159F MED LIST DOCD IN RCRD: CPT | Performed by: PLASTIC SURGERY

## 2025-06-23 RX ORDER — LIDOCAINE HYDROCHLORIDE 10 MG/ML
0.5 INJECTION, SOLUTION EPIDURAL; INFILTRATION; INTRACAUDAL; PERINEURAL
Status: COMPLETED | OUTPATIENT
Start: 2025-06-23 | End: 2025-06-23

## 2025-06-23 RX ORDER — TRIAMCINOLONE ACETONIDE 40 MG/ML
20 INJECTION, SUSPENSION INTRA-ARTICULAR; INTRAMUSCULAR
Status: COMPLETED | OUTPATIENT
Start: 2025-06-23 | End: 2025-06-23

## 2025-06-23 RX ADMIN — LIDOCAINE HYDROCHLORIDE 0.5 ML: 10 INJECTION, SOLUTION EPIDURAL; INFILTRATION; INTRACAUDAL; PERINEURAL at 10:49

## 2025-06-23 RX ADMIN — TRIAMCINOLONE ACETONIDE 20 MG: 40 INJECTION, SUSPENSION INTRA-ARTICULAR; INTRAMUSCULAR at 10:49

## 2025-06-23 NOTE — PROGRESS NOTES
Procedure   - Hand/Upper Extremity Injection: R extensor compartment 1 for de Quervain's tenosynovitis on 6/23/2025 10:49 AM  Indications: pain  Details: 27 G needle, radial approach  Medications: 0.5 mL lidocaine PF 1% 1 %; 20 mg triamcinolone acetonide 40 MG/ML  Outcome: tolerated well, no immediate complications  Procedure, treatment alternatives, risks and benefits explained, specific risks discussed. Consent was given by the patient. Immediately prior to procedure a time out was called to verify the correct patient, procedure, equipment, support staff and site/side marked as required. Patient was prepped and draped in the usual sterile fashion.

## 2025-06-23 NOTE — PROGRESS NOTES
"                                                               [facility]     Office Visit       Date: 06/23/2025   Patient Name: Janet Hurtado  MRN: [id number]  YOB: 1975    Referring Physician: Satya Garcia DO     Chief Complaint:   Chief Complaint   Patient presents with    Right Wrist - Pain       History of Present Illness:   Janet Hurtado is a 50 y.o. female right-hand-dominant presents with right radial wrist pain of 3 months duration.  She reports pain over the radial styloid that radiates distally into her thumb.  Reports it also radiates across her dorsal wrist.  Denies inciting trauma.  Reports symptoms are worsened with use.  She has tried bracing anti-inflammatories and also has an MRI of her right wrist which demonstrates evidence of mild de Quervain's tenosynovitis.  She is not currently working.  She denies smoking.  She has no other relevant medical history.      Subjective   Review of Systems:   Review of Systems   Musculoskeletal:  Positive for arthralgias.   All other systems reviewed and are negative.       Pertinent review of systems per HPI.     I reviewed the patient's chief complaint, history of present illness, review of systems, past medical history, surgical history, family history, social history, medications and allergy list in the EMR on 06/23/2025 and agree with the findings above.    Objective    Vital Signs:   Vitals:    06/23/25 1017   BP: 110/72   Weight: 73.9 kg (162 lb 14.4 oz)   Height: 165.1 cm (65\")     BMI: Body mass index is 27.11 kg/m².    General Appearance: No acute distress. Alert and oriented.     Chest:  Non-labored breathing on room air. Regular rate and rhythm.    Upper Extremity Exam:    Tender to palpation of the right radial styloid with associated small retinacular cyst.  Positive Finkelstein's test.  Mildly tender over the dorsal radiocarpal joint.  Mildly tender to palpation over the dorsal lunate.  Nontender over " the TFCC.  Nontender over the volar scaphoid.  DRUJ stable in pronation supination.  Nontender over the thumb CMC.  Negative CMC shuck test.  Nontender over the thumb A1 pulley.    Fingers are warm, well-perfused with appropriate capillary refill.  Palpable radial pulse.    Sensation intact to light touch in median, radial and ulnar nerve distributions.    Motor- Fires FPL, ulnar intrinsics, EPL/EDC w/ full active and passive range of motion. Strength intact.    Non-tender except for in the areas highlighted    Imaging/Studies:   Imaging Results (Last 24 Hours)       Procedure Component Value Units Date/Time    XR Wrist 3+ View Right [[id number]] Resulted: 06/23/25 1037     Updated: 06/23/25 1037    Narrative:      Right Wrist X-Ray    Indication: Pain    Views:  AP, Lateral, and Oblique     Comparison:  None    Findings:  No fracture  Mild sclerotic appearance of the right lunate without obvious collapse.  2 mm ulnar negative variance.  Normal joint spaces    Impression:   Mild sclerotic appearance of the right lunate consistent with possible   early Kienböck's disease.  No obvious fracture or collapse            MRI of the right wrist from 5/29/2025 was apparently been interpreted myself demonstrates evidence of tenosynovitis is associated with the first dorsal extensor compartment.  Mild edema in the lunate but no evidence of avascular necrosis or collapse.      Procedures:  Procedures    Quality Measures:   ACP:   ACP discussion was deferred.    Tobacco:   Janet Hurtado  reports that she has never smoked. She has never been exposed to tobacco smoke. She has never used smokeless tobacco.      Assessment / Plan    Assessment/Plan:     There are no diagnoses linked to this encounter.     Janet Hurtadois a 50 y.o. female who presents with:      ICD-10-CM ICD-9-CM   1. De Quervain's tenosynovitis  M65.4 727.04   2. Right wrist pain  M25.531 719.43         Patient presents with right wrist pain and evidence of  de Quervain's tenosynovitis on exam.  We discussed her diagnosis as well as treatment options were bracing anti-inflammatories corticosteroid injection and de Quervain's release.  Recommend a trial of nonoperative treatment with right wrist de Quervain's corticosteroid injection today.    She also has evidence of possible lunate sclerosis on x-ray and tenderness over the dorsal lunate concerning for possible early Kienböck's disease.  No evidence of obvious kienbocks disease or lunate sclerosis on MRI noncontrast.  Recommend continued observation.  Recommend follow-up patient follow-up with me in 6 weeks.    Procedure Note: DeQuervain's injection    I discussed with the patient the potential benefits of performing therapeutic injections as well as potential risks including but not limited to infection, swelling, pain, bleeding, bruising, nerve/vessel damage, skin color changes, transient elevation in blood glucose levels, and fat atrophy. After informed consent and after the areas were prepped with chlorhexadine soap, ethyl chloride was used to numb the skin. Using anatomic landmarks, 20mg of Kenalog and 0.5 cc of 1% lidocaine was injected into the first dorsal compartment of the right wrist.  The patient tolerated the procedure well. There were no complications. A sterile dressing was placed over the injection sites.      Follow Up:   Return in about 6 weeks (around 8/4/2025).        Jose De Jesus Matamoros MD  [facility] [place] Hand and Upper Extremity Surgeon